# Patient Record
Sex: MALE | Race: WHITE | RURAL
[De-identification: names, ages, dates, MRNs, and addresses within clinical notes are randomized per-mention and may not be internally consistent; named-entity substitution may affect disease eponyms.]

---

## 2017-02-21 ENCOUNTER — TELEPHONE (OUTPATIENT)
Dept: INTERNAL MEDICINE CLINIC | Age: 67
End: 2017-02-21

## 2017-02-21 DIAGNOSIS — E78.2 MIXED HYPERLIPIDEMIA: Primary | ICD-10-CM

## 2017-02-21 NOTE — TELEPHONE ENCOUNTER
Patient wants labs drawn prior to his appt - please order in system  Zach Sow LPN  3/31/1903  30:02 AM

## 2017-02-21 NOTE — TELEPHONE ENCOUNTER
Patient would like to receive labs before his appt on February 28, 2017. Please call him at [phone].

## 2017-02-22 ENCOUNTER — TELEPHONE (OUTPATIENT)
Dept: INTERNAL MEDICINE CLINIC | Age: 67
End: 2017-02-22

## 2017-03-03 LAB
CHOLEST SERPL-MCNC: 257 MG/DL (ref 100–199)
HDLC SERPL-MCNC: 55 MG/DL
INTERPRETATION, 910389: NORMAL
LDLC SERPL CALC-MCNC: 173 MG/DL (ref 0–99)
TRIGL SERPL-MCNC: 144 MG/DL (ref 0–149)
VLDLC SERPL CALC-MCNC: 29 MG/DL (ref 5–40)

## 2017-03-09 ENCOUNTER — OFFICE VISIT (OUTPATIENT)
Dept: INTERNAL MEDICINE CLINIC | Age: 67
End: 2017-03-09

## 2017-03-09 VITALS
DIASTOLIC BLOOD PRESSURE: 62 MMHG | HEIGHT: 69 IN | BODY MASS INDEX: 35.4 KG/M2 | SYSTOLIC BLOOD PRESSURE: 125 MMHG | HEART RATE: 76 BPM | WEIGHT: 239 LBS | OXYGEN SATURATION: 97 % | RESPIRATION RATE: 20 BRPM | TEMPERATURE: 96 F

## 2017-03-09 DIAGNOSIS — E78.5 HYPERLIPIDEMIA, UNSPECIFIED HYPERLIPIDEMIA TYPE: ICD-10-CM

## 2017-03-09 DIAGNOSIS — R06.02 SOB (SHORTNESS OF BREATH) ON EXERTION: ICD-10-CM

## 2017-03-09 DIAGNOSIS — R07.9 CHEST PAIN OF UNCERTAIN ETIOLOGY: Primary | ICD-10-CM

## 2017-03-09 RX ORDER — GUAIFENESIN 100 MG/5ML
81 LIQUID (ML) ORAL DAILY
Qty: 90 TAB | Refills: 3 | Status: SHIPPED | OUTPATIENT
Start: 2017-03-09 | End: 2018-02-01 | Stop reason: ALTCHOICE

## 2017-03-09 NOTE — PATIENT INSTRUCTIONS
Learning About High Cholesterol  What is high cholesterol? Cholesterol is a type of fat in your blood. It is needed for many body functions, such as making new cells. Cholesterol is made by your body. It also comes from food you eat. If you have too much cholesterol, it starts to build up in your arteries. This is called hardening of the arteries, or atherosclerosis. High cholesterol raises your risk of a heart attack and stroke. There are different types of cholesterol. LDL is the \"bad\" cholesterol. High LDL can raise your risk for heart disease, heart attack, and stroke. HDL is the \"good\" cholesterol. High HDL is linked with a lower risk for heart disease, heart attack, and stroke. Your cholesterol levels help your doctor find out your risk for having a heart attack or stroke. How can you prevent high cholesterol? A heart-healthy lifestyle can help you prevent high cholesterol. This lifestyle helps lower your risk for a heart attack and stroke. · Eat heart-healthy foods. ¨ Eat fruits, vegetables, whole grains (like oatmeal), dried beans and peas, nuts and seeds, soy products (like tofu), and fat-free or low-fat dairy products. ¨ Replace butter, margarine, and hydrogenated or partially hydrogenated oils with olive and canola oils. (Canola oil margarine without trans fat is fine.)  ¨ Replace red meat with fish, poultry, and soy protein (like tofu). ¨ Limit processed and packaged foods like chips, crackers, and cookies. · Be active. Exercise can improve your cholesterol level. Get at least 30 minutes of exercise on most days of the week. Walking is a good choice. You also may want to do other activities, such as running, swimming, cycling, or playing tennis or team sports. · Stay at a healthy weight. Lose weight if you need to. · Don't smoke. If you need help quitting, talk to your doctor about stop-smoking programs and medicines. These can increase your chances of quitting for good.   How is high cholesterol treated? The goal of treatment is to reduce your chances of having a heart attack or stroke. The goal is not to lower your cholesterol numbers only. · You may make lifestyle changes, such as eating healthy foods, not smoking, losing weight, and being more active. · You may have to take medicine. Follow-up care is a key part of your treatment and safety. Be sure to make and go to all appointments, and call your doctor if you are having problems. It's also a good idea to know your test results and keep a list of the medicines you take. Where can you learn more? Go to http://yadyavolutionjoo.info/. Enter W377 in the search box to learn more about \"Learning About High Cholesterol. \"  Current as of: January 27, 2016  Content Version: 11.1  © 3046-5136 [employer]. Care instructions adapted under license by [employer] (which disclaims liability or warranty for this information). If you have questions about a medical condition or this instruction, always ask your healthcare professional. [place] [address] any warranty or liability for your use of this information. Stress Echocardiogram: About This Test  What is it? An echocardiogram (also called an echo) uses sound waves to make an image of your heart. A device called a transducer is moved across your chest. It looks like a microphone. The transducer sends sound waves that echo off your heart and back to the transducer. These echoes are turned into moving pictures of your heart that can be seen on a video screen. In a stress echocardiogram, an echo is done while your heart is at rest and after your heart is made to work hard (stressed). You exercise to make your heart work hard. Sometimes, instead of exercise, a medicine is used that makes your heart respond like you have been exercising. Why is this test done?   A stress echocardiogram is usually done to find out if you might have reduced blood flow to your heart. This is known as coronary artery disease. Reduced blood flow is easier to see when your heart is put under some form of stress. How can you prepare for the test?  · Do not smoke or eat a heavy meal before this test.  · Wear flat, comfortable shoes (no bedroom slippers) and loose, lightweight shorts or sweatpants. Walking or running shoes are best.  · Tell your doctor if:  Lisha Esteban You are taking any medicines. ¨ You are taking medicine for an erection problem, such as sildenafil (Viagra), tadalafil (Cialis), and vardenafil (Levitra). You may need to take nitroglycerin during this test, which can cause a serious reaction if you have taken a medicine for an erection problem within the past 48 hours. ¨ You have had bleeding problems, or if you take aspirin or some other blood thinner. ¨ You have joint problems in your hips or legs that may make it hard for you to exercise. ¨ You have a heart valve problem. What happens during the test?  You will first have an echocardiogram before exercising. This is called the baseline. You then exercise for a specific amount of time and then have another echocardiogram.  · You will take off all or most of your clothes and change into a gown. · You will lie on your back or on your left side on a bed or table. · You may receive medicine through a vein (intravenously, or IV). The IV can be used to give you a contrast material, which helps your doctor get good views of your heart. · Small pads or patches (electrodes) will be taped to your arms and legs to record your heart rate during the test.  · A small amount of gel will be rubbed on the left side of your chest to help  the sound waves. · The transducer will be pressed firmly against your chest and moved slowly back and forth. It is usually moved to different areas on your chest to get specific views of your heart.   · You will be asked to do several things, such as hold very still, breathe in and out very slowly, hold your breath, or lie on your left side. This test usually takes 30 to 60 minutes. When the echocardiogram is finished, you will exercise and then have another echocardiogram. If you are not able to exercise, you may be given medicine that stimulates your heart to beat harder and faster, as if you were exercising. You most likely will either walk on a treadmill or pedal a stationary bicycle. While you exercise:  · Your heart rate and blood pressure are recorded. · You might be asked to use numbers to say how hard you are exercising. The higher the number, the harder you think you are exercising. · You will continue to exercise until you or your doctor feels you need to stop. · You will then lie on a bed or table, and another echocardiogram will be done. An exercise stress echocardiogram takes about 30 to 60 minutes. What else should you know about the test?  · No electricity passes through your body during the test. There is no danger of getting an electrical shock. · During the tests, tell your doctor if:  ¨ You have chest pain. ¨ You are very short of breath. ¨ You are lightheaded. ¨ You have other symptoms. What happens after the test?  · You will be able to sit or lie down and rest.  · Your heart rate and blood pressure will be checked for about 5 to 10 minutes. · You can go back to your usual activities right away. When should you call for help? Call 911 anytime you think you may need emergency care. For example, call if:  · You passed out (lost consciousness). · You have been diagnosed with angina, and you have angina symptoms that do not go away with rest or are not getting better within 5 minutes after you take a dose of nitroglycerin. · You have symptoms of a heart attack, such as:  ¨ Chest pain or pressure. ¨ Sweating. ¨ Shortness of breath. ¨ Nausea or vomiting.   ¨ Pain that spreads from the chest to the neck, jaw, or one or both shoulders or arms.  ¨ Dizziness or lightheadedness. ¨ A fast or uneven pulse. After calling 911, chew 1 adult-strength aspirin. Wait for an ambulance. Do not try to drive yourself. Call your doctor now or seek immediate medical care if:  · You are having angina symptoms more often than usual, even if they go away when you rest or take nitroglycerin. · You feel dizzy or lightheaded, or you feel like you may faint. Watch closely for changes in your health, and be sure to contact your doctor if you have any problems. Follow-up care is a key part of your treatment and safety. Be sure to make and go to all appointments, and call your doctor if you are having problems. It's also a good idea to keep a list of the medicines you take. Ask your doctor when you can expect to have your test results. Where can you learn more? Go to http://yady-joo.info/. Enter W274 in the search box to learn more about \"Stress Echocardiogram: About This Test.\"  Current as of: May 2, 2016  Content Version: 11.1  © 1533-1844 [employer]. Care instructions adapted under license by [employer] (which disclaims liability or warranty for this information). If you have questions about a medical condition or this instruction, always ask your healthcare professional. [address] any warranty or liability for your use of this information.

## 2017-03-09 NOTE — MR AVS SNAPSHOT
Visit Information Date & Time Provider Department Dept. Phone Encounter #  
 3/9/2017  7:45 AM Nena Bradford MD [address] [facility] [phone] [id number] Follow-up Instructions Return in about 6 weeks (around 4/20/2017) for routine follow up. Upcoming Health Maintenance Date Due COLONOSCOPY 3/19/1970 ZOSTER VACCINE AGE 60> 3/19/2012 DTaP/Tdap/Td series (2 - Td) 1/21/2026 Allergies as of 3/9/2017  Review Complete On: 3/9/2017 By: Farideh Tobias LPN No Known Allergies Current Immunizations  Never Reviewed Name Date Tdap 1/21/2016 Not reviewed this visit You Were Diagnosed With   
  
 Codes Comments Chest pain of uncertain etiology    -  Primary ICD-10-CM: R07.89 ICD-9-CM: 786.59   
 SOB (shortness of breath) on exertion     ICD-10-CM: R06.02 
ICD-9-CM: 786.05 Hyperlipidemia, unspecified hyperlipidemia type     ICD-10-CM: E78.5 ICD-9-CM: 272.4 Vitals BP Pulse Temp Resp Height(growth percentile) Weight(growth percentile) 125/62 (BP 1 Location: Left arm, BP Patient Position: Sitting) 76 96 °F (35.6 °C) (Oral) 20 5' 9\" (1.753 m) 239 lb (108.4 kg) SpO2 BMI Smoking Status 97% 35.29 kg/m2 Former Smoker Vitals History BMI and BSA Data Body Mass Index Body Surface Area  
 35.29 kg/m 2 2.3 m 2 Preferred Pharmacy Pharmacy Name Phone [address] [facility], [address] [phone] Your Updated Medication List  
  
   
This list is accurate as of: 3/9/17  8:34 AM.  Always use your most recent med list.  
  
  
  
  
 aspirin 81 mg chewable tablet Take 1 Tab by mouth daily. sildenafil citrate 100 mg tablet Commonly known as:  VIAGRA Take 1 Tab by mouth as needed. Prescriptions Sent to Pharmacy Refills  
 aspirin 81 mg chewable tablet 3 Sig: Take 1 Tab by mouth daily.   
 Class: Normal  
 Pharmacy: [facility], [address] [place] [address] #: [phone] Route: Oral  
  
Follow-up Instructions Return in about 6 weeks (around 4/20/2017) for routine follow up. To-Do List   
 03/13/2017 ECHO:  ECHO EXERCISE STRESS Patient Instructions Learning About High Cholesterol What is high cholesterol? Cholesterol is a type of fat in your blood. It is needed for many body functions, such as making new cells. Cholesterol is made by your body. It also comes from food you eat. If you have too much cholesterol, it starts to build up in your arteries. This is called hardening of the arteries, or atherosclerosis. High cholesterol raises your risk of a heart attack and stroke. There are different types of cholesterol. LDL is the \"bad\" cholesterol. High LDL can raise your risk for heart disease, heart attack, and stroke. HDL is the \"good\" cholesterol. High HDL is linked with a lower risk for heart disease, heart attack, and stroke. Your cholesterol levels help your doctor find out your risk for having a heart attack or stroke. How can you prevent high cholesterol? A heart-healthy lifestyle can help you prevent high cholesterol. This lifestyle helps lower your risk for a heart attack and stroke. · Eat heart-healthy foods. ¨ Eat fruits, vegetables, whole grains (like oatmeal), dried beans and peas, nuts and seeds, soy products (like tofu), and fat-free or low-fat dairy products. ¨ Replace butter, margarine, and hydrogenated or partially hydrogenated oils with olive and canola oils. (Canola oil margarine without trans fat is fine.) ¨ Replace red meat with fish, poultry, and soy protein (like tofu). ¨ Limit processed and packaged foods like chips, crackers, and cookies. · Be active. Exercise can improve your cholesterol level. Get at least 30 minutes of exercise on most days of the week. Walking is a good choice. You also may want to do other activities, such as running, swimming, cycling, or playing tennis or team sports. · Stay at a healthy weight. Lose weight if you need to. · Don't smoke. If you need help quitting, talk to your doctor about stop-smoking programs and medicines. These can increase your chances of quitting for good. How is high cholesterol treated? The goal of treatment is to reduce your chances of having a heart attack or stroke. The goal is not to lower your cholesterol numbers only. · You may make lifestyle changes, such as eating healthy foods, not smoking, losing weight, and being more active. · You may have to take medicine. Follow-up care is a key part of your treatment and safety. Be sure to make and go to all appointments, and call your doctor if you are having problems. It's also a good idea to know your test results and keep a list of the medicines you take. Where can you learn more? Go to http://yady-joo.info/. Enter T648 in the search box to learn more about \"Learning About High Cholesterol. \" Current as of: January 27, 2016 Content Version: 11.1 © 4909-0160 [employer]. Care instructions adapted under license by [employer] (which disclaims liability or warranty for this information). If you have questions about a medical condition or this instruction, always ask your healthcare professional. [address] any warranty or liability for your use of this information. Stress Echocardiogram: About This Test 
What is it? An echocardiogram (also called an echo) uses sound waves to make an image of your heart. A device called a transducer is moved across your chest. It looks like a microphone. The transducer sends sound waves that echo off your heart and back to the transducer. These echoes are turned into moving pictures of your heart that can be seen on a video screen. In a stress echocardiogram, an echo is done while your heart is at rest and after your heart is made to work hard (stressed). You exercise to make your heart work hard. Sometimes, instead of exercise, a medicine is used that makes your heart respond like you have been exercising. Why is this test done? A stress echocardiogram is usually done to find out if you might have reduced blood flow to your heart. This is known as coronary artery disease. Reduced blood flow is easier to see when your heart is put under some form of stress. How can you prepare for the test? 
· Do not smoke or eat a heavy meal before this test. 
· Wear flat, comfortable shoes (no bedroom slippers) and loose, lightweight shorts or sweatpants. Walking or running shoes are best. 
· Tell your doctor if: 
Oval Osler You are taking any medicines. ¨ You are taking medicine for an erection problem, such as sildenafil (Viagra), tadalafil (Cialis), and vardenafil (Levitra). You may need to take nitroglycerin during this test, which can cause a serious reaction if you have taken a medicine for an erection problem within the past 48 hours. ¨ You have had bleeding problems, or if you take aspirin or some other blood thinner. ¨ You have joint problems in your hips or legs that may make it hard for you to exercise. ¨ You have a heart valve problem. What happens during the test? 
You will first have an echocardiogram before exercising. This is called the baseline. You then exercise for a specific amount of time and then have another echocardiogram. 
· You will take off all or most of your clothes and change into a gown. · You will lie on your back or on your left side on a bed or table. · You may receive medicine through a vein (intravenously, or IV). The IV can be used to give you a contrast material, which helps your doctor get good views of your heart.  
· Small pads or patches (electrodes) will be taped to your arms and legs to record your heart rate during the test. 
· A small amount of gel will be rubbed on the left side of your chest to help  the sound waves. · The transducer will be pressed firmly against your chest and moved slowly back and forth. It is usually moved to different areas on your chest to get specific views of your heart. · You will be asked to do several things, such as hold very still, breathe in and out very slowly, hold your breath, or lie on your left side. This test usually takes 30 to 60 minutes. When the echocardiogram is finished, you will exercise and then have another echocardiogram. If you are not able to exercise, you may be given medicine that stimulates your heart to beat harder and faster, as if you were exercising. You most likely will either walk on a treadmill or pedal a stationary bicycle. While you exercise: 
· Your heart rate and blood pressure are recorded. · You might be asked to use numbers to say how hard you are exercising. The higher the number, the harder you think you are exercising. · You will continue to exercise until you or your doctor feels you need to stop. · You will then lie on a bed or table, and another echocardiogram will be done. An exercise stress echocardiogram takes about 30 to 60 minutes. What else should you know about the test? 
· No electricity passes through your body during the test. There is no danger of getting an electrical shock. · During the tests, tell your doctor if: 
¨ You have chest pain. ¨ You are very short of breath. ¨ You are lightheaded. ¨ You have other symptoms. What happens after the test? 
· You will be able to sit or lie down and rest. 
· Your heart rate and blood pressure will be checked for about 5 to 10 minutes. · You can go back to your usual activities right away. When should you call for help? Call 911 anytime you think you may need emergency care. For example, call if: 
· You passed out (lost consciousness). · You have been diagnosed with angina, and you have angina symptoms that do not go away with rest or are not getting better within 5 minutes after you take a dose of nitroglycerin. · You have symptoms of a heart attack, such as: ¨ Chest pain or pressure. ¨ Sweating. ¨ Shortness of breath. ¨ Nausea or vomiting. ¨ Pain that spreads from the chest to the neck, jaw, or one or both shoulders or arms. ¨ Dizziness or lightheadedness. ¨ A fast or uneven pulse. After calling 911, chew 1 adult-strength aspirin. Wait for an ambulance. Do not try to drive yourself. Call your doctor now or seek immediate medical care if: 
· You are having angina symptoms more often than usual, even if they go away when you rest or take nitroglycerin. · You feel dizzy or lightheaded, or you feel like you may faint. Watch closely for changes in your health, and be sure to contact your doctor if you have any problems. Follow-up care is a key part of your treatment and safety. Be sure to make and go to all appointments, and call your doctor if you are having problems. It's also a good idea to keep a list of the medicines you take. Ask your doctor when you can expect to have your test results. Where can you learn more? Go to http://yady-joo.info/. Enter E946 in the search box to learn more about \"Stress Echocardiogram: About This Test.\" Current as of: May 2, 2016 Content Version: 11.1 © 1978-9193 [email or web address]. Care instructions adapted under license by [employer] (which disclaims liability or warranty for this information). If you have questions about a medical condition or this instruction, always ask your healthcare professional. [place] [address] any warranty or liability for your use of this information. Introducing \[facility]\"" & HEALTH SERVICES!    
 [employer] introduces [employer] patient portal. Now you can access parts of your medical record, email your doctor's office, and request medication refills online. 1. In your internet browser, go to [email or web address] 2. Click on the First Time User? Click Here link in the Sign In box. You will see the New Member Sign Up page. 3. Enter your Krauttools Access Code exactly as it appears below. You will not need to use this code after youve completed the sign-up process. If you do not sign up before the expiration date, you must request a new code. · Krauttools Access Code: [email or web address] Expires: 3/30/2017  2:25 PM 
 
4. Enter the last four digits of your Social Security Number (xxxx) and Date of Birth (mm/dd/yyyy) as indicated and click Submit. You will be taken to the next sign-up page. 5. Create a Krauttools ID. This will be your Krauttools login ID and cannot be changed, so think of one that is secure and easy to remember. 6. Create a Krauttools password. You can change your password at any time. 7. Enter your Password Reset Question and Answer. This can be used at a later time if you forget your password. 8. Enter your e-mail address. You will receive e-mail notification when new information is available in [address] 9. Click Sign Up. You can now view and download portions of your medical record. 10. Click the Download Summary menu link to download a portable copy of your medical information. If you have questions, please visit the Frequently Asked Questions section of the Krauttools website. Remember, Krauttools is NOT to be used for urgent needs. For medical emergencies, dial 911. Now available from your iPhone and Android! Please provide this summary of care documentation to your next provider. Your primary care clinician is listed as Willie Hurt. If you have any questions after today's visit, please call [phone].

## 2017-03-09 NOTE — PROGRESS NOTES
Subjective:     Parviz Lamb is a 61 y.o. male who presents for follow up of hyperlipidemia. New concerns: statins caused muscle aches. Ate lasagna and woke up with CP not comfortable. Occurred in Feb.  Called his daughter and she insisted he go to [facility], did studies inc an EKG. Neg w/u. No CP currently, No Hx of heart Dx. Notes inc SOB if exertional. Not new. Asks about testosterone. Not on replacement. Current Outpatient Prescriptions   Medication Sig Dispense Refill    sildenafil citrate (VIAGRA) 100 mg tablet Take 1 Tab by mouth as needed. 5 Tab 5     No Known Allergies    Diet and Lifestyle: not attempting to follow a low fat, low cholesterol diet    Cardiovascular ROS: no TIA's, no chest pain on exertion, notes new dyspnea on exertion more SOB, when exertional, not sure how long, was hauling . Review of Systems, additional:  Pertinent items are noted in HPI. Patient Active Problem List    Diagnosis Date Noted    Hyperlipidemia 04/07/2011    Hypogonadism male 04/07/2011     Current Outpatient Prescriptions   Medication Sig Dispense Refill    aspirin 81 mg chewable tablet Take 1 Tab by mouth daily. 90 Tab 3    sildenafil citrate (VIAGRA) 100 mg tablet Take 1 Tab by mouth as needed. 5 Tab 5     No Known Allergies  Social History   Substance Use Topics    Smoking status: Former Smoker     Quit date: 6/16/1979    Smokeless tobacco: Not on file    Alcohol use 8.4 oz/week     14 Standard drinks or equivalent per week        Will get [place] labs done in ER         Objective:     Physical exam significant for the following: WNL  Visit Vitals    /62 (BP 1 Location: Left arm, BP Patient Position: Sitting)    Pulse 76    Temp 96 °F (35.6 °C) (Oral)    Resp 20    Ht 5' 9\" (1.753 m)    Wt 239 lb (108.4 kg)    SpO2 97%    BMI 35.29 kg/m2     Appearance: alert, well appearing, and in no distress.   General exam: CVS exam BP noted to be well controlled today in office, S1, S2 normal, no gallop, no murmur, chest clear, no JVD, no HSM, no edema. .   Assessment/Plan:     hyperlipidemia control uncertain. Keaton Fiore Has not changed diet, will re-check. epsiode of CP get stress test  Discussed possible side affects, precautions, and drug interactions and possible benefits of the medication(s). Of testosterone replacement. ICD-10-CM ICD-9-CM    1. Chest pain of uncertain etiology L89.17 786.59 ECHO EXERCISE STRESS      CANCELED: STRESS CARDIAC WALL MOTION   2. SOB (shortness of breath) on exertion R06.02 786.05 ECHO EXERCISE STRESS   3. Hyperlipidemia, unspecified hyperlipidemia type E78.5 272.4      Orders Placed This Encounter    ECHO EXERCISE STRESS     Standing Status:   Future     Standing Expiration Date:   9/9/2017     Scheduling Instructions:      [facility] Doctors     Order Specific Question:   Reason for Exam:     Answer:   episode of CP and Increased SOB    aspirin 81 mg chewable tablet     Sig: Take 1 Tab by mouth daily. Dispense:  90 Tab     Refill:  3     Follow-up Disposition:  Return in about 6 weeks (around 4/20/2017) for routine follow up.

## 2017-03-09 NOTE — LETTER
3/9/2017 8:11 AM 
 
Mr. Angel MILLER [address] [facility] [address] Dear Angel Wong: 
 
Please find your most recent results below. Resulted Orders LIPID PANEL Result Value Ref Range Cholesterol, total 257 (H) 100 - 199 mg/dL Triglyceride 144 0 - 149 mg/dL HDL Cholesterol 55 >39 mg/dL VLDL, calculated 29 5 - 40 mg/dL LDL, calculated 173 (H) 0 - 99 mg/dL Narrative Performed at:  [address]  [id number] : Carl Prado MD, Phone:  [phone] CVD REPORT Result Value Ref Range INTERPRETATION Note Comment:  
   Supplement report is available. Narrative Performed at:  [address] [place] [address]  [id number] : Fatuma Umana PhD, Phone:  [phone] RECOMMENDATIONS: 
Work on diet and exercise. Please call me if you have any questions: [phone] Sincerely, Chao Villaseñor MD

## 2017-03-13 ENCOUNTER — DOCUMENTATION ONLY (OUTPATIENT)
Dept: INTERNAL MEDICINE CLINIC | Age: 67
End: 2017-03-13

## 2017-03-13 NOTE — PROGRESS NOTES
Notified patient of appointment scheduled for stress ECHO on Thursday March 16, 2017 at [address] [place] [address] by 830 am to Pham Manriquez - instructed no food or drink after midnight, wear comfortable walking shoes - phone number [phone] and fax [phone] - order has been faxed  Kelvin Lara LPN  7/35/7569  5:14 PM

## 2017-03-16 ENCOUNTER — DOCUMENTATION ONLY (OUTPATIENT)
Dept: INTERNAL MEDICINE CLINIC | Age: 67
End: 2017-03-16

## 2017-03-16 NOTE — PROGRESS NOTES
Received call from [facility] - [place] needed for stress echo scheduled for today - called [address] [place] [address] at [phone] - Approval received after Dr review and additional clinical information - Approved #[id number] 3/16/17 - 4/14/17 - notified [facility] admitting office of this approval -  Vania Donnelly LPN  5/06/6637  8:46 AM

## 2017-03-24 ENCOUNTER — DOCUMENTATION ONLY (OUTPATIENT)
Dept: INTERNAL MEDICINE CLINIC | Age: 67
End: 2017-03-24

## 2017-03-24 NOTE — PROGRESS NOTES
Pt visited to see if we have gotten results for his stress test wanted to pu information regarding test adv that he may check back to see if results are in  Pt would like a call back with the stress test results pt appt was on 3/16 at [facility] would like nurse to please contact cardiology to check on the status of pt results

## 2017-03-27 NOTE — PROGRESS NOTES
Called [facility] EMERGENCY MEDICAL [place] at [phone] echo department to request copy of most recent stress echo report - no answer - left message on the answering machine to return my call or to fax the results to [address], LPN  6/94/0192  6:13 PM

## 2017-03-28 NOTE — PROGRESS NOTES
Called again to [facility] EMERGENCY [facility] - the ECHO department will send us a copy of this report MARIA E Sow LPN  3/73/9230  2:20 PM

## 2017-03-29 NOTE — PROGRESS NOTES
Notified patient that stress test results are normal per order of Tameka Silveira LPN  9/03/2326  7:43 AM

## 2017-04-26 ENCOUNTER — OFFICE VISIT (OUTPATIENT)
Dept: INTERNAL MEDICINE CLINIC | Age: 67
End: 2017-04-26

## 2017-04-26 VITALS
OXYGEN SATURATION: 97 % | RESPIRATION RATE: 16 BRPM | TEMPERATURE: 97.8 F | SYSTOLIC BLOOD PRESSURE: 137 MMHG | HEART RATE: 84 BPM | DIASTOLIC BLOOD PRESSURE: 76 MMHG | BODY MASS INDEX: 34.07 KG/M2 | WEIGHT: 230 LBS | HEIGHT: 69 IN

## 2017-04-26 DIAGNOSIS — L02.92 BOIL: ICD-10-CM

## 2017-04-26 DIAGNOSIS — D22.9 ATYPICAL MOLE: Primary | ICD-10-CM

## 2017-04-26 NOTE — PATIENT INSTRUCTIONS
Skin Lesion Removal: What to Expect at Home  Your Recovery  After your procedure, you should not have much pain. But some soreness, swelling, or bruising is normal. Your doctor may recommend over-the-counter medicines to help with any discomfort. Most people can return to their normal routine the same day of their procedure. How quickly your wound heals depends on the size of your wound and the type of procedure you had. Most wounds take 1 to 3 weeks to heal. If you had laser surgery, your skin may change color and then slowly return to its normal color. You may need only a bandage, or you may need stitches. If you had stitches, your doctor will probably remove them 5 to 14 days later. If you have the type of stitches that dissolve, they do not have to be removed. They will disappear on their own. This care sheet gives you a general idea about how long it will take for you to recover. But each person recovers at a different pace. Follow the steps below to get better as quickly as possible. How can you care for yourself at home? Activity  · For the first few days, try not to bump or knock your wound. · Depending on where your wound is, you may need to avoid strenuous exercise for 2 weeks after the procedure or until your doctor says it is okay. · If you have had a lesion removed from your face, do not use makeup near your wound until you have your stitches taken out. · Ask your doctor when it is okay to shower, bathe, or swim. Medicines  · Your doctor will tell you if and when you can restart your medicines. He or she will also give you instructions about taking any new medicines. · If you take blood thinners, such as warfarin (Coumadin), clopidogrel (Plavix), or aspirin, be sure to talk to your doctor. He or she will tell you if and when to start taking those medicines again. Make sure that you understand exactly what your doctor wants you to do. · Be safe with medicines.  Take pain medicines exactly as directed. ¨ If the doctor gave you a prescription medicine for pain, take it as prescribed. ¨ If you are not taking a prescription pain medicine, ask your doctor if you can take an over-the-counter medicine. Wound care  · If your doctor told you how to care for your incision, follow your doctor's instructions. If you did not get instructions, follow this general advice:  ¨ Keep the wound bandaged and dry for the first day. ¨ After the first 24 to 48 hours, wash around the wound with clean water 2 times a day. Don't use hydrogen peroxide or alcohol, which can slow healing. ¨ You may cover the wound with a thin layer of petroleum jelly, such as Vaseline, and a nonstick bandage. ¨ Apply more petroleum jelly and replace the bandage as needed. · If you have stitches, you may get other instructions. · If a scab forms, do not pull it off. Let it fall off on its own. Wounds heal faster if no scab forms. Washing the area every day and using petroleum jelly will help prevent a scab from forming. · If the wound bleeds, put direct pressure on it with a clean cloth until the bleeding stops. · If you had a growth \"frozen\" off, you may get a blister. Do not break it. Let it dry up on its own. It is common for the blister to fill with blood. You do not need to do anything about this, but if it becomes too painful, call your doctor. · Avoid the sun until your stitches are removed. Follow-up care is a key part of your treatment and safety. Be sure to make and go to all appointments, and call your doctor if you are having problems. It's also a good idea to know your test results and keep a list of the medicines you take. When should you call for help? Call 911 anytime you think you may need emergency care. For example, call if:  · You passed out (lost consciousness). · You have severe trouble breathing. · You have sudden chest pain and shortness of breath, or you cough up blood.   Call your doctor now or seek immediate medical care if:  · You have signs of infection, such as:  ¨ Increased pain, swelling, warmth, or redness. ¨ Red streaks leading from the wound. ¨ Pus draining from the wound. ¨ A fever. · You have pain that does not get better after you take pain medicine. · You have loose stitches. Watch closely for changes in your health, and be sure to contact your doctor if you have any problems. Where can you learn more? Go to http://yady-joo.info/. Enter Q228 in the search box to learn more about \"Skin Lesion Removal: What to Expect at Home. \"  Current as of: October 13, 2016  Content Version: 11.2  © 0422-3291 [employer]. Care instructions adapted under license by [employer] (which disclaims liability or warranty for this information). If you have questions about a medical condition or this instruction, always ask your healthcare professional. [address] any warranty or liability for your use of this information.

## 2017-04-26 NOTE — MR AVS SNAPSHOT
Visit Information Date & Time Provider Department Dept. Phone Encounter #  
 4/26/2017  1:45 PM Mica Scruggs  [facility] [id number] Follow-up Instructions Return if symptoms worsen or fail to improve. Upcoming Health Maintenance Date Due ZOSTER VACCINE AGE 60> 3/19/2012 GLAUCOMA SCREENING Q2Y 3/19/2017 Pneumococcal 65+ Low/Medium Risk (1 of 2 - PCV13) 3/19/2017 MEDICARE YEARLY EXAM 3/19/2017 COLONOSCOPY 5/18/2025 DTaP/Tdap/Td series (2 - Td) 1/21/2026 Allergies as of 4/26/2017  Review Complete On: 3/9/2017 By: Rudi Telles LPN No Known Allergies Current Immunizations  Never Reviewed Name Date Tdap 1/21/2016 Not reviewed this visit You Were Diagnosed With   
  
 Codes Comments Atypical mole    -  Primary ICD-10-CM: D22.9 ICD-9-CM: 216.9 Boil     ICD-10-CM: L02.92 
ICD-9-CM: 680.9 Vitals BP Pulse Temp Resp Height(growth percentile) Weight(growth percentile)  
 137/76 (BP 1 Location: Left arm, BP Patient Position: Sitting) 84 97.8 °F (36.6 °C) (Oral) 16 5' 9\" (1.753 m) 230 lb (104.3 kg) SpO2 BMI Smoking Status 97% 33.97 kg/m2 Former Smoker BMI and BSA Data Body Mass Index Body Surface Area  
 33.97 kg/m 2 2.25 m 2 Preferred Pharmacy Pharmacy Name Phone [address] [facility], [address] [facility] [address] [facility] [phone] Your Updated Medication List  
  
   
This list is accurate as of: 4/26/17  2:38 PM.  Always use your most recent med list.  
  
  
  
  
 aspirin 81 mg chewable tablet Take 1 Tab by mouth daily. sildenafil citrate 100 mg tablet Commonly known as:  VIAGRA Take 1 Tab by mouth as needed. We Performed the Following DRAIN SKIN ABSCESS SIMPLE [79943 CPT(R)] EXC SKIN BENIG 0.6-1CM REMAINDR BODY [73334 CPT(R)] Follow-up Instructions Return if symptoms worsen or fail to improve. Patient Instructions Skin Lesion Removal: What to Expect at Home Your Recovery After your procedure, you should not have much pain. But some soreness, swelling, or bruising is normal. Your doctor may recommend over-the-counter medicines to help with any discomfort. Most people can return to their normal routine the same day of their procedure. How quickly your wound heals depends on the size of your wound and the type of procedure you had. Most wounds take 1 to 3 weeks to heal. If you had laser surgery, your skin may change color and then slowly return to its normal color. You may need only a bandage, or you may need stitches. If you had stitches, your doctor will probably remove them 5 to 14 days later. If you have the type of stitches that dissolve, they do not have to be removed. They will disappear on their own. This care sheet gives you a general idea about how long it will take for you to recover. But each person recovers at a different pace. Follow the steps below to get better as quickly as possible. How can you care for yourself at home? Activity · For the first few days, try not to bump or knock your wound. · Depending on where your wound is, you may need to avoid strenuous exercise for 2 weeks after the procedure or until your doctor says it is okay. · If you have had a lesion removed from your face, do not use makeup near your wound until you have your stitches taken out. · Ask your doctor when it is okay to shower, bathe, or swim. Medicines · Your doctor will tell you if and when you can restart your medicines. He or she will also give you instructions about taking any new medicines. · If you take blood thinners, such as warfarin (Coumadin), clopidogrel (Plavix), or aspirin, be sure to talk to your doctor. He or she will tell you if and when to start taking those medicines again. Make sure that you understand exactly what your doctor wants you to do. · Be safe with medicines. Take pain medicines exactly as directed. ¨ If the doctor gave you a prescription medicine for pain, take it as prescribed. ¨ If you are not taking a prescription pain medicine, ask your doctor if you can take an over-the-counter medicine. Wound care · If your doctor told you how to care for your incision, follow your doctor's instructions. If you did not get instructions, follow this general advice: ¨ Keep the wound bandaged and dry for the first day. ¨ After the first 24 to 48 hours, wash around the wound with clean water 2 times a day. Don't use hydrogen peroxide or alcohol, which can slow healing. ¨ You may cover the wound with a thin layer of petroleum jelly, such as Vaseline, and a nonstick bandage. ¨ Apply more petroleum jelly and replace the bandage as needed. · If you have stitches, you may get other instructions. · If a scab forms, do not pull it off. Let it fall off on its own. Wounds heal faster if no scab forms. Washing the area every day and using petroleum jelly will help prevent a scab from forming. · If the wound bleeds, put direct pressure on it with a clean cloth until the bleeding stops. · If you had a growth \"frozen\" off, you may get a blister. Do not break it. Let it dry up on its own. It is common for the blister to fill with blood. You do not need to do anything about this, but if it becomes too painful, call your doctor. · Avoid the sun until your stitches are removed. Follow-up care is a key part of your treatment and safety. Be sure to make and go to all appointments, and call your doctor if you are having problems. It's also a good idea to know your test results and keep a list of the medicines you take. When should you call for help? Call 911 anytime you think you may need emergency care. For example, call if: 
· You passed out (lost consciousness). · You have severe trouble breathing. · You have sudden chest pain and shortness of breath, or you cough up blood. Call your doctor now or seek immediate medical care if: 
· You have signs of infection, such as: 
¨ Increased pain, swelling, warmth, or redness. ¨ Red streaks leading from the wound. ¨ Pus draining from the wound. ¨ A fever. · You have pain that does not get better after you take pain medicine. · You have loose stitches. Watch closely for changes in your health, and be sure to contact your doctor if you have any problems. Where can you learn more? Go to http://yady-joo.info/. Enter Q228 in the search box to learn more about \"Skin Lesion Removal: What to Expect at Home. \" Current as of: October 13, 2016 Content Version: 11.2 © 4655-8594 "[employer]. Care instructions adapted under license by [employer] (which disclaims liability or warranty for this information). If you have questions about a medical condition or this instruction, always ask your healthcare professional. [place] [address] any warranty or liability for your use of this information. Introducing [facility]! Kamilla Westbrook introduces "[employer]" patient portal. Now you can access parts of your medical record, email your doctor's office, and request medication refills online. 1. In your internet browser, go to [email or web address]. [place]/Probki Iz okna 2. Click on the First Time User? Click Here link in the Sign In box. You will see the New Member Sign Up page. 3. Enter your "[employer]" Access Code exactly as it appears below. You will not need to use this code after youve completed the sign-up process. If you do not sign up before the expiration date, you must request a new code. · "[employer]" Access Code: [email or web address] Expires: 7/25/2017  1:38 PM 
 
4. Enter the last four digits of your Social Security Number (xxxx) and Date of Birth (mm/dd/yyyy) as indicated and click Submit.  You will be taken to the next sign-up page. 5. Create a [employer] ID. This will be your [employer] login ID and cannot be changed, so think of one that is secure and easy to remember. 6. Create a [employer] password. You can change your password at any time. 7. Enter your Password Reset Question and Answer. This can be used at a later time if you forget your password. 8. Enter your e-mail address. You will receive e-mail notification when new information is available in [address] 9. Click Sign Up. You can now view and download portions of your medical record. 10. Click the Download Summary menu link to download a portable copy of your medical information. If you have questions, please visit the Frequently Asked Questions section of the [employer] website. Remember, [employer] is NOT to be used for urgent needs. For medical emergencies, dial 911. Now available from your iPhone and Android! Please provide this summary of care documentation to your next provider. Your primary care clinician is listed as Omar Mcginnis. If you have any questions after today's visit, please call [phone].

## 2017-04-28 ENCOUNTER — HOSPITAL ENCOUNTER (OUTPATIENT)
Dept: LAB | Age: 67
Discharge: HOME | End: 2017-04-28

## 2017-04-28 NOTE — LETTER
5/19/2017 4:19 PM 
 
Mr. Betty Cisse [address] [address] Dear Betty Anastasiaalcon: YOUR RECENT LABS ARE NORMAL Please find your most recent results below. Please call me if you have any questions: [phone] Sincerely, LAB OUTREACH CLIENT

## 2017-04-29 NOTE — PROGRESS NOTES
HISTORY OF PRESENT ILLNESS  Duane Collette Galli is a 74 y.o. male. Mole   The history is provided by the patient. This is a new problem. The current episode started more than 1 week ago. The problem occurs daily. The problem has been gradually worsening. Pertinent negatives include no chest pain and no abdominal pain. Associated symptoms comments: Mole occa bleeds when it gets caught. He has tried nothing for the symptoms. Also has an adjacent boil    Review of Systems   Cardiovascular: Negative for chest pain. Gastrointestinal: Negative for abdominal pain. Skin: Positive for rash. Negative for itching. Physical Exam  Visit Vitals    /76 (BP 1 Location: Left arm, BP Patient Position: Sitting)    Pulse 84    Temp 97.8 °F (36.6 °C) (Oral)    Resp 16    Ht 5' 9\" (1.753 m)    Wt 230 lb (104.3 kg)    SpO2 97%    BMI 33.97 kg/m2     WD WN male NAD  Heart RRR without murmers clicks or rubs  Lungs CTA  Abdo soft nontender  Ext no edema  Right chest 1 cm raised nodule. 2 cm right boil  ASSESSMENT and PLAN  Encounter Diagnoses   Name Primary?  Atypical mole Yes    Boil      Orders Placed This Encounter    ID BIOPSY OF SKIN LESION    PATHOLOGY-SURGICAL     Scheduling Instructions:      Removal of mole upper R/chest     The area was cleaned with alcohol. It was numbed with  Ethyl Chl. A incision was made over the area. It drained pus  no, serosanguinous fluid , yes . A pressure dressing was applied. The boil was packed  no. A wound culture was done  no. Anibiotics were given  no. The procedure was well tolerated. Area to be excised was cleaned with EtOH. The area was numbed with lidocaine/epi. Using a 8 mm hole biopsy device the are of skin was excised and removed. The procedure was well tolerated. There was some bleeding but it was easily stopped with pressure. Sample for path. Follow-up Disposition:  Return if symptoms worsen or fail to improve.

## 2018-01-11 ENCOUNTER — TELEPHONE (OUTPATIENT)
Dept: INTERNAL MEDICINE CLINIC | Age: 68
End: 2018-01-11

## 2018-01-11 DIAGNOSIS — E29.1 HYPOGONADISM MALE: ICD-10-CM

## 2018-01-11 DIAGNOSIS — E78.5 HYPERLIPIDEMIA, UNSPECIFIED HYPERLIPIDEMIA TYPE: Primary | ICD-10-CM

## 2018-01-11 NOTE — TELEPHONE ENCOUNTER
Per order of Dr Blanca Anderson ordered labs for [facility] and Lipid panel and given order to patient - he will take it to [facility] today to have these drawn - he is fasting  Martin Reyes LPN  5/76/2409  46:58 AM

## 2018-01-11 NOTE — TELEPHONE ENCOUNTER
Pt visited practice wanted an lab order for his labs so that he can take it over to [facility]

## 2018-01-12 LAB
ALBUMIN SERPL-MCNC: 4.4 G/DL (ref 3.6–4.8)
ALBUMIN/GLOB SERPL: 1.7 {RATIO} (ref 1.2–2.2)
ALP SERPL-CCNC: 82 IU/L (ref 39–117)
ALT SERPL-CCNC: 45 IU/L (ref 0–44)
AST SERPL-CCNC: 24 IU/L (ref 0–40)
BILIRUB SERPL-MCNC: 0.5 MG/DL (ref 0–1.2)
BUN SERPL-MCNC: 11 MG/DL (ref 8–27)
BUN/CREAT SERPL: 12 (ref 10–24)
CALCIUM SERPL-MCNC: 9.3 MG/DL (ref 8.6–10.2)
CHLORIDE SERPL-SCNC: 101 MMOL/L (ref 96–106)
CHOLEST SERPL-MCNC: 263 MG/DL (ref 100–199)
CO2 SERPL-SCNC: 25 MMOL/L (ref 18–29)
CREAT SERPL-MCNC: 0.93 MG/DL (ref 0.76–1.27)
GLOBULIN SER CALC-MCNC: 2.6 G/DL (ref 1.5–4.5)
GLUCOSE SERPL-MCNC: 91 MG/DL (ref 65–99)
HDLC SERPL-MCNC: 63 MG/DL
INTERPRETATION, 910389: NORMAL
LDLC SERPL CALC-MCNC: 172 MG/DL (ref 0–99)
POTASSIUM SERPL-SCNC: 4.6 MMOL/L (ref 3.5–5.2)
PROT SERPL-MCNC: 7 G/DL (ref 6–8.5)
SODIUM SERPL-SCNC: 141 MMOL/L (ref 134–144)
TRIGL SERPL-MCNC: 140 MG/DL (ref 0–149)
VLDLC SERPL CALC-MCNC: 28 MG/DL (ref 5–40)

## 2018-01-15 ENCOUNTER — TELEPHONE (OUTPATIENT)
Dept: INTERNAL MEDICINE CLINIC | Age: 68
End: 2018-01-15

## 2018-01-29 ENCOUNTER — TELEPHONE (OUTPATIENT)
Dept: INTERNAL MEDICINE CLINIC | Age: 68
End: 2018-01-29

## 2018-01-29 NOTE — TELEPHONE ENCOUNTER
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

## 2018-02-01 ENCOUNTER — OFFICE VISIT (OUTPATIENT)
Dept: INTERNAL MEDICINE CLINIC | Age: 68
End: 2018-02-01

## 2018-02-01 VITALS
SYSTOLIC BLOOD PRESSURE: 125 MMHG | TEMPERATURE: 97.3 F | WEIGHT: 231 LBS | RESPIRATION RATE: 20 BRPM | HEART RATE: 70 BPM | OXYGEN SATURATION: 99 % | DIASTOLIC BLOOD PRESSURE: 68 MMHG | HEIGHT: 69 IN | BODY MASS INDEX: 34.21 KG/M2

## 2018-02-01 DIAGNOSIS — Z02.4 ENCOUNTER FOR CDL (COMMERCIAL DRIVING LICENSE) EXAM: ICD-10-CM

## 2018-02-01 DIAGNOSIS — Z13.1 SCREENING FOR DIABETES MELLITUS: ICD-10-CM

## 2018-02-01 DIAGNOSIS — Z23 ENCOUNTER FOR IMMUNIZATION: ICD-10-CM

## 2018-02-01 DIAGNOSIS — Z13.39 SCREENING FOR ALCOHOLISM: ICD-10-CM

## 2018-02-01 DIAGNOSIS — R74.8 ELEVATED LIVER ENZYMES: ICD-10-CM

## 2018-02-01 DIAGNOSIS — E78.00 ELEVATED CHOLESTEROL: ICD-10-CM

## 2018-02-01 DIAGNOSIS — Z13.6 SCREENING FOR ISCHEMIC HEART DISEASE: ICD-10-CM

## 2018-02-01 DIAGNOSIS — Z13.31 SCREENING FOR DEPRESSION: ICD-10-CM

## 2018-02-01 DIAGNOSIS — Z00.00 WELCOME TO MEDICARE PREVENTIVE VISIT: Primary | ICD-10-CM

## 2018-02-01 LAB
BILIRUB UR QL STRIP: NEGATIVE
GLUCOSE UR-MCNC: NEGATIVE MG/DL
KETONES P FAST UR STRIP-MCNC: NEGATIVE MG/DL
PH UR STRIP: 7 [PH] (ref 4.6–8)
PROT UR QL STRIP: NEGATIVE
SP GR UR STRIP: 1.02 (ref 1–1.03)
UA UROBILINOGEN AMB POC: NORMAL (ref 0.2–1)
URINALYSIS CLARITY POC: CLEAR
URINALYSIS COLOR POC: YELLOW
URINE BLOOD POC: NEGATIVE
URINE LEUKOCYTES POC: NEGATIVE
URINE NITRITES POC: NEGATIVE

## 2018-02-01 NOTE — PATIENT INSTRUCTIONS
Medicare Wellness Visit, Male    The best way to live healthy is to have a healthy lifestyle by eating a well-balanced diet, exercising regularly, limiting alcohol and stopping smoking. Regular physical exams and screening tests are another way to keep healthy. Preventive exams provided by your health care provider can find health problems before they become diseases or illnesses. Preventive services including immunizations, screening tests, monitoring and exams can help you take care of your own health. All people over age 72 should have a pneumovax  and and a prevnar shot to prevent pneumonia. These are once in a lifetime unless you and your provider decide differently. All people over 65 should have a yearly flu shot and a tetanus vaccine every 10 years. Screening for diabetes mellitus with a blood sugar test should be done every year. Glaucoma is a disease of the eye due to increased ocular pressure that can lead to blindness and it should be done every year by an eye professional.    Cardiovascular screening tests that check for elevated lipids (fatty part of blood) which can lead to heart disease and strokes should be done every 5 years. Colorectal screening that evaluates for blood or polyps in your colon should be done yearly as a stool test or every five years as a flexible sigmoidoscope or every 10 years as a colonoscopy up to age 76. Men up to age 76 may need a screening blood test for prostate cancer at certain intervals, depending on their personal and family history. This decision is between the patient and his provider. If you have been a smoker or had family history of abdominal aortic aneurysms, you and your provider may decide to schedule an ultrasound test of your aorta. Hepatitis C screening is also recommended for anyone born between [birth year] through [address]. A shingles vaccine is also recommended once in a lifetime after age 61.     Your Medicare Wellness Exam is recommended annually. Here is a list of your current Health Maintenance items with a due date:  Health Maintenance Due   Topic Date Due    Shingles Vaccine  01/19/2012    Glaucoma Screening   03/19/2017    Pneumococcal Vaccine (1 of 2 - PCV13) 03/19/2017    Annual Well Visit  03/19/2017          Learning About Low-Fat Eating  What is low-fat eating? Most food has some fat in it. Your body needs some fat to be healthy. But some kinds of fats are healthier than others. In a low-fat eating plan, you try to choose healthier fats and eat fewer unhealthy fats. Healthy fats include olive and canola oil. Try to avoid eating too much saturated fat (such as in cheese and meats) and trans fat (a type of fat found in many packaged snack foods and other baked goods). You do not need to cut all fat from your diet. But you can make healthier choices about the types and amount of fat you eat. Even though it is a good idea to choose healthier fats, it is still important to be careful of how much fat you eat, because all fats are high in calories. What are the different types of fats? Unhealthy fats  · Saturated fat. Saturated fats are mostly in animal foods, such as meat and dairy foods. Tropical oils, such as coconut oil, palm oil, and cocoa butter, are also saturated fats. · Trans fat. Trans fats include shortening, partially hydrogenated vegetable oils, and hydrogenated vegetable oils. Trans fats are made when a liquid fat is turned into a solid fat (for example, when corn oil is made into stick margarine). They are in many processed foods, such as cookies, crackers, and snack foods. Healthy fats  · Monounsaturated fat. Monounsaturated fats are liquid at room temperature but get solid when refrigerated. Eating foods that are high in this fat may help lower your \"bad\" (LDL) cholesterol, keep your \"good\" (HDL) cholesterol level up, and lower your chances of getting heart disease.  This fat is found in canola oil, olive oil, peanut oil, olives, avocados, nuts, and nut butters. · Polyunsaturated fat. Polyunsaturated fats are liquid at room temperature. They are in safflower, sunflower, and corn oils. They are also the main fat in seafood. Omega-3 fatty acids are types of polyunsaturated fat. Omega-3 fatty acids may lower your chances of getting heart disease. Fatty fish such as salmon and mackerel contain these healthy fatty acids. So do ground flaxseeds and flaxseed oil, soybeans, walnuts, and seeds. Why cut down on unhealthy fats? Eating foods that contain saturated fats can raise the LDL (\"bad\") cholesterol in your blood. Having a high level of LDL cholesterol increases your chance of hardening of the arteries (atherosclerosis), which can lead to heart disease, heart attack, and stroke. Trans fat raises the level of \"bad\" LDL cholesterol in your blood and may lower the \"good\" HDL cholesterol in your blood. Trans fat can raise your risk of heart disease, heart attack, and stroke. In general:  · No more than 10% of your daily calories should come from saturated fat. This is about 20 grams in a 2,000-calorie diet. · No more than 10% of your daily calories should come from polyunsaturated fat. This is about 20 grams in a 2,000-calorie diet. · Monounsaturated fats can be up to 15% of your daily calories. This is about 25 to 30 grams in a 2,000-calorie diet. If you're not sure how much fat you should be eating or how many calories you need each day to stay at a healthy weight, talk to a registered dietitian. He or she can help you create a plan that's right for you. What can you do to cut down on fat? Foods like cheese, butter, sausage, and desserts can have a lot of unhealthy fats. Try these tips for healthier meals at home and when you eat out. At home  · Fill up on fruits, vegetables, and whole grains.   · Think of meat as a side dish instead of as the main part of your meal.  · When you do eat meat, make it extra-lean ground beef (97% lean), ground turkey breast (without skin added), meats with fat trimmed off before cooking, or skinless chicken. · Try main dishes that use whole wheat pasta, brown rice, dried beans, or vegetables. · Use cooking methods that use little or no fat, such as broiling, steaming, or grilling. Use cooking spray instead of oil. If you use oil, use a monounsaturated oil, such as canola or olive oil. · Read food labels on canned, bottled, or packaged foods. Choose those with little saturated fat and no trans fat. When eating out at a restaurant  · Order foods that are broiled or poached instead of fried or breaded. · Cut back on the amount of butter or margarine that you use on bread. Use small amounts of olive oil instead. · Order sauces, gravies, and salad dressings on the side, and use only a little. · When you order pasta, choose tomato sauce instead of cream sauce. · Ask for salsa with your baked potato instead of sour cream, butter, cheese, or jorge. Where can you learn more? Go to http://yady-joo.info/. Enter W211 in the search box to learn more about \"Learning About Low-Fat Eating. \"  Current as of: May 12, 2017  Content Version: 11.4  © 3958-9155 [employer], [employer]. Care instructions adapted under license by [employer] (which disclaims liability or warranty for this information). If you have questions about a medical condition or this instruction, always ask your healthcare professional. [place] [address] any warranty or liability for your use of this information.

## 2018-02-01 NOTE — PROGRESS NOTES
Welcome to Medicare visit - also DOT physical (see form) - no other c/o  Carolyn North LPN  0/2/3591  40:34 AM

## 2018-02-01 NOTE — MR AVS SNAPSHOT
[address] [facility] 
[phone] Patient: Adam Aceves MRN: [id number] Visit Information Date & Time Provider Department Dept. Phone Encounter #  
 2/1/2018  9:45 AM Joaquin Victor MD [address] [facility] [phone] [id number] Follow-up Instructions Return in about 6 months (around 8/1/2018). Upcoming Health Maintenance Date Due ZOSTER VACCINE AGE 60> 1/19/2012 GLAUCOMA SCREENING Q2Y 3/19/2017 Pneumococcal 65+ Low/Medium Risk (1 of 2 - PCV13) 3/19/2017 MEDICARE YEARLY EXAM 3/19/2017 COLONOSCOPY 5/18/2025 DTaP/Tdap/Td series (2 - Td) 1/21/2026 Allergies as of 2/1/2018  Review Complete On: 2/1/2018 By: Joaquin Victor MD  
 No Known Allergies Current Immunizations  Reviewed on 2/1/2018 Name Date Influenza Vaccine 12/1/2017 Pneumococcal Conjugate (PCV-13)  Incomplete Tdap 1/21/2016 Reviewed by Joaquin Victor MD on 2/1/2018 at 10:24 AM  
You Were Diagnosed With   
  
 Codes Comments Welcome to Medicare preventive visit    -  Primary ICD-10-CM: Z00.00 ICD-9-CM: V70.0 Encounter for immunization     ICD-10-CM: D63 ICD-9-CM: V03.89 Screening for alcoholism     ICD-10-CM: Z13.89 ICD-9-CM: V79.1 Screening for depression     ICD-10-CM: Z13.89 ICD-9-CM: V79.0 Screening for diabetes mellitus     ICD-10-CM: Z13.1 ICD-9-CM: V77.1 Screening for ischemic heart disease     ICD-10-CM: Z13.6 ICD-9-CM: V81.0 Encounter for CDL (commercial driving license) exam     [id number]-[id number]: Z02.4 ICD-9-CM: V70.5 Elevated liver enzymes     ICD-10-CM: R74.8 ICD-9-CM: 790.5 Elevated cholesterol     ICD-10-CM: E78.00 ICD-9-CM: 272.0 Vitals BP Pulse Temp Resp Height(growth percentile) Weight(growth percentile)  125/68 (BP 1 Location: Left arm, BP Patient Position: At rest) 70 97.3 °F (36.3 °C) (Oral) 20 5' 9\" (1.753 m) 231 lb (104.8 kg) SpO2 BMI Smoking Status 99% 34.11 kg/m2 Former Smoker BMI and BSA Data Body Mass Index Body Surface Area  
 34.11 kg/m 2 2.26 m 2 Preferred Pharmacy Pharmacy Name Phone [address] [facility] [address], [address] [phone] Your Updated Medication List  
  
   
This list is accurate as of: 2/1/18 11:09 AM.  Always use your most recent med list.  
  
  
  
  
 AFLURIA QUAD 6458-2406 (PF) Syrg injection Generic drug:  influenza vaccine 2017-18 (18 yrs+)(PF)  
inject 0.5 milliliter intramuscularly We Performed the Following [address] [KEQY5205 HCP] LIPID PANEL [32420 CPT(R)] PNEUMOCOCCAL CONJ VACCINE 13 VALENT IM [id number] CPT(R)] PA ANNUAL ALCOHOL SCREEN 15 MIN [id number] [facility]] Follow-up Instructions Return in about 6 months (around 8/1/2018). To-Do List   
 02/01/2018 Lab:  LIPID PANEL   
  
 02/01/2018 Lab:  METABOLIC PANEL, COMPREHENSIVE Patient Instructions Medicare Wellness Visit, Male The best way to live healthy is to have a healthy lifestyle by eating a well-balanced diet, exercising regularly, limiting alcohol and stopping smoking. Regular physical exams and screening tests are another way to keep healthy. Preventive exams provided by your health care provider can find health problems before they become diseases or illnesses. Preventive services including immunizations, screening tests, monitoring and exams can help you take care of your own health. All people over age 72 should have a pneumovax  and and a prevnar shot to prevent pneumonia. These are once in a lifetime unless you and your provider decide differently. All people over 65 should have a yearly flu shot and a tetanus vaccine every 10 years. Screening for diabetes mellitus with a blood sugar test should be done every year. Glaucoma is a disease of the eye due to increased ocular pressure that can lead to blindness and it should be done every year by an eye professional. 
 
Cardiovascular screening tests that check for elevated lipids (fatty part of blood) which can lead to heart disease and strokes should be done every 5 years. Colorectal screening that evaluates for blood or polyps in your colon should be done yearly as a stool test or every five years as a flexible sigmoidoscope or every 10 years as a colonoscopy up to age 76. Men up to age 76 may need a screening blood test for prostate cancer at certain intervals, depending on their personal and family history. This decision is between the patient and his provider. If you have been a smoker or had family history of abdominal aortic aneurysms, you and your provider may decide to schedule an ultrasound test of your aorta. Hepatitis C screening is also recommended for anyone born between [birth year] through [address]. A shingles vaccine is also recommended once in a lifetime after age 61. Your Medicare Wellness Exam is recommended annually. Here is a list of your current Health Maintenance items with a due date: 
Health Maintenance Due Topic Date Due  Shingles Vaccine  01/19/2012  Glaucoma Screening   03/19/2017  Pneumococcal Vaccine (1 of 2 - PCV13) 03/19/2017 [employer] Annual Well Visit  03/19/2017 Learning About Low-Fat Eating What is low-fat eating? Most food has some fat in it. Your body needs some fat to be healthy. But some kinds of fats are healthier than others. In a low-fat eating plan, you try to choose healthier fats and eat fewer unhealthy fats. Healthy fats include olive and canola oil. Try to avoid eating too much saturated fat (such as in cheese and meats) and trans fat (a type of fat found in many packaged snack foods and other baked goods). You do not need to cut all fat from your diet.  But you can make healthier choices about the types and amount of fat you eat. Even though it is a good idea to choose healthier fats, it is still important to be careful of how much fat you eat, because all fats are high in calories. What are the different types of fats? Unhealthy fats · Saturated fat. Saturated fats are mostly in animal foods, such as meat and dairy foods. Tropical oils, such as coconut oil, palm oil, and cocoa butter, are also saturated fats. · Trans fat. Trans fats include shortening, partially hydrogenated vegetable oils, and hydrogenated vegetable oils. Trans fats are made when a liquid fat is turned into a solid fat (for example, when corn oil is made into stick margarine). They are in many processed foods, such as cookies, crackers, and snack foods. Healthy fats · Monounsaturated fat. Monounsaturated fats are liquid at room temperature but get solid when refrigerated. Eating foods that are high in this fat may help lower your \"bad\" (LDL) cholesterol, keep your \"good\" (HDL) cholesterol level up, and lower your chances of getting heart disease. This fat is found in canola oil, olive oil, peanut oil, olives, avocados, nuts, and nut butters. · Polyunsaturated fat. Polyunsaturated fats are liquid at room temperature. They are in safflower, sunflower, and corn oils. They are also the main fat in seafood. Omega-3 fatty acids are types of polyunsaturated fat. Omega-3 fatty acids may lower your chances of getting heart disease. Fatty fish such as salmon and mackerel contain these healthy fatty acids. So do ground flaxseeds and flaxseed oil, soybeans, walnuts, and seeds. Why cut down on unhealthy fats? Eating foods that contain saturated fats can raise the LDL (\"bad\") cholesterol in your blood. Having a high level of LDL cholesterol increases your chance of hardening of the arteries (atherosclerosis), which can lead to heart disease, heart attack, and stroke. Trans fat raises the level of \"bad\" LDL cholesterol in your blood and may lower the \"good\" HDL cholesterol in your blood. Trans fat can raise your risk of heart disease, heart attack, and stroke. In general: · No more than 10% of your daily calories should come from saturated fat. This is about 20 grams in a 2,000-calorie diet. · No more than 10% of your daily calories should come from polyunsaturated fat. This is about 20 grams in a 2,000-calorie diet. · Monounsaturated fats can be up to 15% of your daily calories. This is about 25 to 30 grams in a 2,000-calorie diet. If you're not sure how much fat you should be eating or how many calories you need each day to stay at a healthy weight, talk to a registered dietitian. He or she can help you create a plan that's right for you. What can you do to cut down on fat? Foods like cheese, butter, sausage, and desserts can have a lot of unhealthy fats. Try these tips for healthier meals at home and when you eat out. At home · Fill up on fruits, vegetables, and whole grains. · Think of meat as a side dish instead of as the main part of your meal. 
· When you do eat meat, make it extra-lean ground beef (97% lean), ground turkey breast (without skin added), meats with fat trimmed off before cooking, or skinless chicken. · Try main dishes that use whole wheat pasta, brown rice, dried beans, or vegetables. · Use cooking methods that use little or no fat, such as broiling, steaming, or grilling. Use cooking spray instead of oil. If you use oil, use a monounsaturated oil, such as canola or olive oil. · Read food labels on canned, bottled, or packaged foods. Choose those with little saturated fat and no trans fat. When eating out at a restaurant · Order foods that are broiled or poached instead of fried or breaded. · Cut back on the amount of butter or margarine that you use on bread. Use small amounts of olive oil instead. · Order sauces, gravies, and salad dressings on the side, and use only a little. · When you order pasta, choose tomato sauce instead of cream sauce. · Ask for salsa with your baked potato instead of sour cream, butter, cheese, or jorge. Where can you learn more? Go to http://yady-joo.info/. Enter T265 in the search box to learn more about \"Learning About Low-Fat Eating. \" Current as of: May 12, 2017 Content Version: 11.4 © 9373-9709 [employer]. Care instructions adapted under license by [employer] (which disclaims liability or warranty for this information). If you have questions about a medical condition or this instruction, always ask your healthcare professional. [address] any warranty or liability for your use of this information. Introducing [facility]! William Tello introduces webme patient portal. Now you can access parts of your medical record, email your doctor's office, and request medication refills online. 1. In your internet browser, go to [email or web address] 2. Click on the First Time User? Click Here link in the Sign In box. You will see the New Member Sign Up page. 3. Enter your webme Access Code exactly as it appears below. You will not need to use this code after youve completed the sign-up process. If you do not sign up before the expiration date, you must request a new code. · webme Access Code: [email or web address] Expires: 5/2/2018  9:38 AM 
 
4. Enter the last four digits of your Social Security Number (xxxx) and Date of Birth (mm/dd/yyyy) as indicated and click Submit. You will be taken to the next sign-up page. 5. Create a webme ID. This will be your webme login ID and cannot be changed, so think of one that is secure and easy to remember. 6. Create a [employer] password. You can change your password at any time. 7. Enter your Password Reset Question and Answer. This can be used at a later time if you forget your password. 8. Enter your e-mail address. You will receive e-mail notification when new information is available in [address] 9. Click Sign Up. You can now view and download portions of your medical record. 10. Click the Download Summary menu link to download a portable copy of your medical information. If you have questions, please visit the Frequently Asked Questions section of the [employer] website. Remember, [employer] is NOT to be used for urgent needs. For medical emergencies, dial 911. Now available from your iPhone and Android! Please provide this summary of care documentation to your next provider. Your primary care clinician is listed as Regi Archibald. If you have any questions after today's visit, please call [phone].

## 2018-06-12 NOTE — PROGRESS NOTES
This is a \"Welcome to [employer]"  Initial Preventive Physical Examination (IPPE) providing Personalized Prevention Plan Services (Performed in the first 12 months of enrollment)    I have reviewed the patient's medical history in detail and updated the computerized patient record. 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 Blood work done last month showed minorly elevated liver enzyme. Past Medical History:   Diagnosis Date    Colonic polyp       Past Surgical History:   Procedure Laterality Date    HX APPENDECTOMY      HX CHOLECYSTECTOMY      HX COLONOSCOPY       Current Outpatient Prescriptions   Medication Sig Dispense Refill    AFLURIA QUAD [id number], PF, syrg injection inject 0.5 milliliter intramuscularly  0     No Known Allergies  Family History   Problem Relation Age of Onset    Cancer Father      Social History   Substance Use Topics    Smoking status: Former Smoker     Packs/day: 1.25     Types: Cigarettes     Start date: 2/1/1971     Quit date: 6/16/1979    Smokeless tobacco: Never Used    Alcohol use 16.8 oz/week     14 Standard drinks or equivalent, 14 Shots of liquor per week     Diet, Lifestyle: No special diet    Exercise level: sedentary    Depression Risk Screen     PHQ over the last two weeks 2/1/2018   Little interest or pleasure in doing things Not at all   Feeling down, depressed or hopeless Not at all   Total Score PHQ 2 0     Alcohol Risk Screen     He drinks 2 drinks of whiskey daily, not really able to tell me how many ounces whiskey he uses. Functional Ability and Level of Safety   Hearing Loss  Hearing is good. Vision Screening  Vision is good. Visual Acuity Screening    Right eye Left eye Both eyes   Without correction:      With correction: 20/20 20/25 20/20         Activities of Daily Living  The home contains: no safety equipment.   Patient does total self care    Fall Risk Screen  Fall Risk Assessment, last 12 mths 2/1/2018 Able to walk? Yes   Fall in past 12 months? No       Abuse Screen  Patient is not abused    Screening EKG   EKG order placed: No    Patient Care Team   Patient Care Team:  Kecia Kirby MD as PCP - General (Family Practice)     End of Life Planning   Advanced care planning directives were not discussed with the patient and/or family/caregiver. Assessment/Plan   Education and counseling provided:  Pneumococcal Vaccine      ICD-10-CM ICD-9-CM    1. Welcome to Medicare preventive visit Z00.00 V70.0 ADMIN PNEUMOCOCCAL VACCINE      AMB POC URINALYSIS DIP STICK AUTO W/O MICRO   2. Encounter for immunization Z23 V03.89 PNEUMOCOCCAL CONJ VACCINE 13 VALENT IM      ADMIN PNEUMOCOCCAL VACCINE      AMB POC URINALYSIS DIP STICK AUTO W/O MICRO   3. Screening for alcoholism Z13.89 V79.1 ME ANNUAL ALCOHOL SCREEN 15 MIN      ADMIN PNEUMOCOCCAL VACCINE      AMB POC URINALYSIS DIP STICK AUTO W/O MICRO   4. Screening for depression Z13.89 V79.0 DEPRESSION SCREEN ANNUAL      ADMIN PNEUMOCOCCAL VACCINE      AMB POC URINALYSIS DIP STICK AUTO W/O MICRO   5. Screening for diabetes mellitus Z13.1 V77.1 ADMIN PNEUMOCOCCAL VACCINE      AMB POC URINALYSIS DIP STICK AUTO W/O MICRO   6. Screening for ischemic heart disease Z13.6 V81.0 ADMIN PNEUMOCOCCAL VACCINE      AMB POC URINALYSIS DIP STICK AUTO W/O MICRO      CANCELED: LIPID PANEL   7. 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  Elevated liver enzymes R42.9 652.0 METABOLIC PANEL, COMPREHENSIVE      HEPATITIS C AB      HEP B SURFACE AG      ADMIN PNEUMOCOCCAL VACCINE      AMB POC URINALYSIS DIP STICK AUTO W/O MICRO   9. Elevated cholesterol E78.00 272.0 LIPID PANEL      ADMIN PNEUMOCOCCAL VACCINE      AMB POC URINALYSIS DIP STICK AUTO W/O MICRO         Health Maintenance Due   Topic Date Due    ZOSTER VACCINE AGE 60>  01/19/2012    GLAUCOMA SCREENING Q2Y  03/19/2017    Pneumococcal 65+ Low/Medium Risk (1 of 2 - PCV13) 03/19/2017    MEDICARE YEARLY EXAM  03/19/2017     Orders Placed This Encounter    Depression Screen Annual    Pneumococcal Conjugate vaccine - 13 valent (50 years and older)    LIPID PANEL     Standing Status:   Future     Standing Expiration Date:   7/4/2901    METABOLIC PANEL, COMPREHENSIVE     Standing Status:   Future     Standing Expiration Date:   8/4/2018    HEPATITIS C AB     Standing Status:   Future     Standing Expiration Date:   8/2/2018    HEP B SURFACE AG     Standing Status:   Future     Standing Expiration Date:   8/2/2018    AMB POC URINALYSIS DIP STICK AUTO W/O MICRO    Annual  Alcohol Screen 15 min ()    ADMIN PNEUMOCOCCAL VACCINE    AFLURIA QUAD [id number], PF, syrg injection     Sig: inject 0.5 milliliter intramuscularly     Refill:  0     Having had blood work done recently does not want to do that again does agree to have the labs done prior to his next visit. We did discuss reducing his alcohol usage. Follow-up Disposition:  Return in about 6 months (around 8/1/2018). DOT PHYSICAL    Duane TAPAN Lowedebbie 74 y.o. presents for a DOT physical.  He has the following concerns: No complaints. No dizziness, syncope or fainting, exertional chest pain, excessive shortness of breath, focal weakness, abdominal pain, severe depressed mood, thoughts of suicide, recreational drug abuse, excessive alcohol usage. No excessive sleepiness in the day time falling asleep at the wheel or any motor vehicle accidents. No diabetes. No insulin or narcotic agents.     Patient Active Problem List   Diagnosis Code    Hyperlipidemia E78.5     Past Medical History:   Diagnosis Date    Colonic polyp      Past Surgical History:   Procedure Laterality Date    HX APPENDECTOMY      HX CHOLECYSTECTOMY      HX COLONOSCOPY       Social History     Social History    Marital status:      Spouse name: N/A    Number of children: 2    Years of education: N/A     Occupational History    farming      Social History Main Topics    Smoking status: Former Smoker     Packs/day: 1.25     Types: Cigarettes     Start date: 2/1/1971     Quit date: 6/16/1979    Smokeless tobacco: Never Used    Alcohol use 16.8 oz/week     14 Standard drinks or equivalent, 14 Shots of liquor per week    Drug use: No    Sexual activity: Yes     Partners: Female     Other Topics Concern    Not on file     Social History Narrative    Lives with wife       Current Outpatient Prescriptions   Medication Sig Dispense Refill    AFLURIA QUAD [id number], PF, syrg injection inject 0.5 milliliter intramuscularly  0         Results for orders placed or performed in visit on 01/11/18   LIPID PANEL   Result Value Ref Range    Cholesterol, total 263 (H) 100 - 199 mg/dL    Triglyceride 140 0 - 149 mg/dL    HDL Cholesterol 63 >39 mg/dL    VLDL, calculated 28 5 - 40 mg/dL    LDL, calculated 172 (H) 0 - 99 mg/dL   METABOLIC PANEL, COMPREHENSIVE   Result Value Ref Range    Glucose 91 65 - 99 mg/dL    BUN 11 8 - 27 mg/dL    Creatinine 0.93 0.76 - 1.27 mg/dL    GFR est non-AA 86 >59 mL/min/1.73    GFR est AA 99 >59 mL/min/1.73    BUN/Creatinine ratio 12 10 - 24    Sodium 141 134 - 144 mmol/L    Potassium 4.6 3.5 - 5.2 mmol/L    Chloride 101 96 - 106 mmol/L    CO2 25 18 - 29 mmol/L    Calcium 9.3 8.6 - 10.2 mg/dL    Protein, total 7.0 6.0 - 8.5 g/dL    Albumin 4.4 3.6 - 4.8 g/dL    GLOBULIN, TOTAL 2.6 1.5 - 4.5 g/dL    A-G Ratio 1.7 1.2 - 2.2    Bilirubin, total 0.5 0.0 - 1.2 mg/dL    Alk. phosphatase 82 39 - 117 IU/L    AST (SGOT) 24 0 - 40 IU/L    ALT (SGPT) 45 (H) 0 - 44 IU/L   CVD REPORT   Result Value Ref Range    INTERPRETATION Note          ROS: 12 point system revived,please see HPI for pertinent positives.       OBJECTIVE:     Visit Vitals    /68 (BP 1 Location: Left arm, BP Patient Position: At rest)    Pulse 70    Temp 97.3 °F (36.3 °C) (Oral)    Resp 20 done  Ht 5' 9\" (1.753 m)    Wt 231 lb (104.8 kg)    SpO2 99%    BMI 34.11 kg/m2       Vision meet standards and hearing test good. Visual Acuity Screening    Right eye Left eye Both eyes   Without correction:      With correction: 20/20 20/25 20/20          Physical Examination:    General appearance - alert, well appearing, and in no distress. HEENT  PERRLA, EOMI, Sclera clear, anicteric, Oropharynx clear, no lesions. Chest - RRR S1, S2 heard, no peripheral edema. Lungs- Clear to auscultation, no wheezes, rales or rhonchi, has symmetric air entry    Neck- Supple, No adenopathy. Neuro- CN 2 to 11 grossly normal, No neuro deficits              DTR 2/4, strength on upper/lower ext 5/5 emily    Abdomen/groin- ND,NT, No hernia    ROM: Good ROM of upper and lower extremities. Feet normal      Psy- Mood and Affect WNL      ASSESSMENT/PLAN         Healthy appearing person. Advised routine care with PCP. 2 yr DOT card with no restrictions given. DOT papers kept in file and \[facility]\"" web site updated. I have discussed the diagnosis with the patient and the intended plan as seen in the above orders. The patient has received an after-visit summary and questions were answered concerning future plans. I have discussed medication side effects and warnings with the patient as well. Pt verbalizes understanding.

## 2019-02-12 ENCOUNTER — TELEPHONE (OUTPATIENT)
Dept: INTERNAL MEDICINE CLINIC | Age: 69
End: 2019-02-12

## 2019-02-12 DIAGNOSIS — E78.00 ELEVATED CHOLESTEROL: ICD-10-CM

## 2019-02-12 DIAGNOSIS — Z13.1 SCREENING FOR DIABETES MELLITUS: Primary | ICD-10-CM

## 2019-02-12 NOTE — TELEPHONE ENCOUNTER
Pt has a physical on 2/20/19 and would like to know if bloodwork is needed. If so, please call him at [phone]. He would like to get the test done tomorrow morning if possible.

## 2019-02-14 LAB
BUN SERPL-MCNC: 11 MG/DL (ref 8–27)
BUN/CREAT SERPL: 11 (ref 10–24)
CALCIUM SERPL-MCNC: 9.5 MG/DL (ref 8.6–10.2)
CHLORIDE SERPL-SCNC: 104 MMOL/L (ref 96–106)
CHOLEST SERPL-MCNC: 245 MG/DL (ref 100–199)
CO2 SERPL-SCNC: 25 MMOL/L (ref 20–29)
CREAT SERPL-MCNC: 1.02 MG/DL (ref 0.76–1.27)
GLUCOSE SERPL-MCNC: 94 MG/DL (ref 65–99)
HDLC SERPL-MCNC: 63 MG/DL
INTERPRETATION, 910389: NORMAL
LDLC SERPL CALC-MCNC: 144 MG/DL (ref 0–99)
POTASSIUM SERPL-SCNC: 4.6 MMOL/L (ref 3.5–5.2)
SODIUM SERPL-SCNC: 145 MMOL/L (ref 134–144)
TRIGL SERPL-MCNC: 189 MG/DL (ref 0–149)
VLDLC SERPL CALC-MCNC: 38 MG/DL (ref 5–40)

## 2019-02-18 NOTE — TELEPHONE ENCOUNTER
Send normal/stable results letter. Your results are normal/stable. If not signed up, consider getting my chart to get your results on-line. We can help you to sign up. Please follow low cholesterol diet, enclosed. Your cholesterol is higher than we like. Elevated cholesterol increases your risk of strokes and heart attacks, Please follow a low cholesterol diet to decrease your levels. If they do not improve we may consider putting you on medicines, like Lipitor. Cholesterol level has improved.

## 2019-02-20 ENCOUNTER — OFFICE VISIT (OUTPATIENT)
Dept: INTERNAL MEDICINE CLINIC | Age: 69
End: 2019-02-20

## 2019-02-20 VITALS
WEIGHT: 244 LBS | BODY MASS INDEX: 36.14 KG/M2 | RESPIRATION RATE: 16 BRPM | TEMPERATURE: 97.5 F | SYSTOLIC BLOOD PRESSURE: 136 MMHG | HEIGHT: 69 IN | DIASTOLIC BLOOD PRESSURE: 84 MMHG | OXYGEN SATURATION: 95 % | HEART RATE: 70 BPM

## 2019-02-20 DIAGNOSIS — Z13.1 SCREENING FOR DIABETES MELLITUS: Primary | ICD-10-CM

## 2019-02-20 DIAGNOSIS — Z13.31 SCREENING FOR DEPRESSION: ICD-10-CM

## 2019-02-20 DIAGNOSIS — Z13.39 SCREENING FOR ALCOHOLISM: ICD-10-CM

## 2019-02-20 DIAGNOSIS — Z23 ENCOUNTER FOR IMMUNIZATION: ICD-10-CM

## 2019-02-20 DIAGNOSIS — E78.5 HYPERLIPIDEMIA, UNSPECIFIED HYPERLIPIDEMIA TYPE: ICD-10-CM

## 2019-02-20 DIAGNOSIS — Z00.00 MEDICARE ANNUAL WELLNESS VISIT, SUBSEQUENT: ICD-10-CM

## 2019-02-20 PROBLEM — E66.01 SEVERE OBESITY (HCC): Status: ACTIVE | Noted: 2019-02-20

## 2019-02-20 NOTE — PROGRESS NOTES
This is the Subsequent Medicare Annual Wellness Exam, performed 12 months or more after the Initial AWV or the last Subsequent AWV I have reviewed the patient's medical history in detail and updated the computerized patient record. 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  
Diagnosis Date  Colonic polyp Past Surgical History:  
Procedure Laterality Date  HX APPENDECTOMY  HX CHOLECYSTECTOMY  HX COLONOSCOPY Current Outpatient Medications Medication Sig Dispense Refill  AFLURIA QUAD [id number], PF, syrg injection inject 0.5 milliliter intramuscularly  0 No Known Allergies Family History Problem Relation Age of Onset  Cancer Father Social History Tobacco Use  Smoking status: Former Smoker Packs/day: 1.25 Types: Cigarettes Start date: 1971 Last attempt to quit: 1979 Years since quittin.7  Smokeless tobacco: Never Used Substance Use Topics  Alcohol use: Yes Alcohol/week: 16.8 oz Types: 14 Shots of liquor, 14 Standard drinks or equivalent per week Patient Active Problem List  
Diagnosis Code  Hyperlipidemia E78.5  Severe obesity (HCC) E66.01 Depression Risk Factor Screening:  
 
3 most recent PHQ Screens 2019 Little interest or pleasure in doing things Several days Feeling down, depressed, irritable, or hopeless Several days Total Score PHQ 2 2 Alcohol Risk Factor Screening: You average more than 14 drinks a week. Usu just 2 per night Functional Ability and Level of Safety:  
Hearing Loss Hearing is good. Activities of Daily Living The home contains: no safety equipment. Patient does total self care Fall Risk Fall Risk Assessment, last 12 mths 2019 Able to walk? Yes Fall in past 12 months? No  
 
 
Abuse Screen Patient is not abused Cognitive Screening Evaluation of Cognitive Function: Has your family/caregiver stated any concerns about your memory: no 
Abnormal, Clock Drawing test 
 
Patient Care Team  
Patient Care Team: 
Nicky Kaur MD as PCP - [facility]) Assessment/Plan Education and counseling provided: 
Are appropriate based on today's review and evaluation Pneumococcal Vaccine Influenza Vaccine We discussed a screening exam STI and AAA screen and the  the pros and cons of having the test done. The patient made a decision to do the test: yes and dec all Diagnoses and all orders for this visit: 
 
1. Medicare annual wellness visit, subsequent 2. Screening for alcoholism -     [place] ANNUAL ALCOHOL SCREEN 15 MIN 3. Screening for depression 
-     Rolf Varma 4. Encounter for immunization -     ADMIN PNEUMOCOCCAL VACCINE 
-     PNEUMOCOCCAL POLYSACCHARIDE VACCINE, 23-VALENT, ADULT OR IMMUNOSUPPRESSED PT DOSE, Health Maintenance Due Topic Date Due  Shingrix Vaccine Age 50> (1 of 2) 03/19/2002  GLAUCOMA SCREENING Q2Y  03/19/2017  Pneumococcal 65+ Low/Medium Risk (2 of 2 - PPSV23) 02/01/2019  MEDICARE YEARLY EXAM  02/02/2019 Could not give vaccines, not available. Will give the next time he shows up. Can get a nursing appt to get flu and pneum 23 Follow-up Disposition: 
Return in about 6 months (around 8/20/2019).

## 2019-02-20 NOTE — PROGRESS NOTES
Chief Complaint Patient presents with [facility] Annual Wellness Visit I have reviewed the patient's medical history in detail and updated the computerized patient record. Health Maintenance reviewed. 1. Have you been to the ER, urgent care clinic since your last visit? Hospitalized since your last visit?no 2. Have you seen or consulted any other health care providers outside of the [address] [place] [address] since your last visit? Include any pap smears or colon screening. No 
 
 
Encouraged pt to discuss pt's wishes with spouse/partner/family and bring them in the next appt to follow thru with the Advanced Directive Fall Risk Assessment, last 12 mths 2/20/2019 Able to walk? Yes Fall in past 12 months? No  
 
 
3 most recent PHQ Screens 2/20/2019 Little interest or pleasure in doing things Several days Feeling down, depressed, irritable, or hopeless Several days Total Score PHQ 2 2 Abuse Screening Questionnaire 2/20/2019 Do you ever feel afraid of your partner? Aselinoh Jean Are you in a relationship with someone who physically or mentally threatens you? Asequincy Pena Is it safe for you to go home? Y  
 
 

## 2019-02-20 NOTE — PATIENT INSTRUCTIONS
Medicare Wellness Visit, Male The best way to live healthy is to have a lifestyle where you eat a well-balanced diet, exercise regularly, limit alcohol use, and quit all forms of tobacco/nicotine, if applicable. Regular preventive services are another way to keep healthy. Preventive services (vaccines, screening tests, monitoring & exams) can help personalize your care plan, which helps you manage your own care. Screening tests can find health problems at the earliest stages, when they are easiest to treat. [address] [facility] follows the current, evidence-based guidelines published by the [facility] Suresh Hazel ([facility]) when recommending preventive services for our patients. Because we follow these guidelines, sometimes recommendations change over time as research supports it. (For example, a prostate screening blood test is no longer routinely recommended for men with no symptoms.) Of course, you and your doctor may decide to screen more often for some diseases, based on your risk and co-morbidities (chronic disease you are already diagnosed with). Preventive services for you include: - Medicare offers their members a free annual wellness visit, which is time for you and your primary care provider to discuss and plan for your preventive service needs. Take advantage of this benefit every year! 
-All adults over age 72 should receive the recommended pneumonia vaccines. Current USPSTF guidelines recommend a series of two vaccines for the best pneumonia protection.  
-All adults should have a flu vaccine yearly and an ECG.  All adults age 61 and older should receive a shingles vaccine once in their lifetime.   
-All adults age 38-68 who are overweight should have a diabetes screening test once every three years.  
-Other screening tests & preventive services for persons with diabetes include: an eye exam to screen for diabetic retinopathy, a kidney function test, a foot exam, and stricter control over your cholesterol.  
-Cardiovascular screening for adults with routine risk involves an electrocardiogram (ECG) at intervals determined by the provider.  
-Colorectal cancer screening should be done for adults age 54-65 with no increased risk factors for colorectal cancer. There are a number of acceptable methods of screening for this type of cancer. Each test has its own benefits and drawbacks. Discuss with your provider what is most appropriate for you during your annual wellness visit. The different tests include: colonoscopy (considered the best screening method), a fecal occult blood test, a fecal DNA test, and sigmoidoscopy. 
-All adults born between [facility] and [facility] should be screened once for Hepatitis C. 
-An Abdominal Aortic Aneurysm (AAA) Screening is recommended for men age 73-68 who has ever smoked in their lifetime. Here is a list of your current Health Maintenance items (your personalized list of preventive services) with a due date: 
Health Maintenance Due Topic Date Due  Shingles Vaccine (1 of 2) 03/19/2002  Glaucoma Screening   03/19/2017  Pneumococcal Vaccine (2 of 2 - PPSV23) 02/01/2019 [facility] Annual Well Visit  02/02/2019

## 2019-03-12 ENCOUNTER — TELEPHONE (OUTPATIENT)
Dept: INTERNAL MEDICINE CLINIC | Age: 69
End: 2019-03-12

## 2019-03-12 ENCOUNTER — CLINICAL SUPPORT (OUTPATIENT)
Dept: INTERNAL MEDICINE CLINIC | Age: 69
End: 2019-03-12

## 2019-03-12 VITALS
HEART RATE: 69 BPM | SYSTOLIC BLOOD PRESSURE: 152 MMHG | BODY MASS INDEX: 36.03 KG/M2 | TEMPERATURE: 97.3 F | DIASTOLIC BLOOD PRESSURE: 86 MMHG | RESPIRATION RATE: 18 BRPM | HEIGHT: 69 IN | OXYGEN SATURATION: 97 %

## 2019-03-12 DIAGNOSIS — Z23 ENCOUNTER FOR IMMUNIZATION: ICD-10-CM

## 2019-03-12 RX ORDER — SILDENAFIL 100 MG/1
100 TABLET, FILM COATED ORAL AS NEEDED
Qty: 5 TAB | Refills: 5 | Status: SHIPPED | OUTPATIENT
Start: 2019-03-12 | End: 2019-08-20 | Stop reason: ALTCHOICE

## 2019-03-12 NOTE — PROGRESS NOTES
Chief Complaint   Patient presents with    Immunization/Injection     PREVNAR 13 AND FLU     Immunization/s administered 3/12/2019 by Judith Naranjo LPN. Patient tolerated procedure well. No reactions noted.

## 2019-03-12 NOTE — TELEPHONE ENCOUNTER
Requested Prescriptions     Pending Prescriptions Disp Refills    sildenafil citrate (VIAGRA) 100 mg tablet 5 Tab 5     Sig: Take 1 Tab by mouth as needed.      Last Office Visit  02.20.2019  Last Refill  49.02.60

## 2019-03-12 NOTE — TELEPHONE ENCOUNTER
Chief Complaint   Patient presents with    Medication Refill     VIAGARA     Receipt of Rx confirmed by Kb Gordillo, pharmacist.   verified. Patient notified of Rx electronically sent to pharmacy.

## 2019-04-04 ENCOUNTER — OFFICE VISIT (OUTPATIENT)
Dept: INTERNAL MEDICINE CLINIC | Age: 69
End: 2019-04-04

## 2019-04-04 VITALS
HEIGHT: 69 IN | RESPIRATION RATE: 16 BRPM | TEMPERATURE: 97.9 F | SYSTOLIC BLOOD PRESSURE: 142 MMHG | HEART RATE: 74 BPM | WEIGHT: 245 LBS | BODY MASS INDEX: 36.29 KG/M2 | DIASTOLIC BLOOD PRESSURE: 95 MMHG | OXYGEN SATURATION: 95 %

## 2019-04-04 DIAGNOSIS — E78.5 HYPERLIPIDEMIA, UNSPECIFIED HYPERLIPIDEMIA TYPE: ICD-10-CM

## 2019-04-04 DIAGNOSIS — M10.472 ACUTE GOUT DUE TO OTHER SECONDARY CAUSE INVOLVING TOE OF LEFT FOOT: Primary | ICD-10-CM

## 2019-04-04 RX ORDER — COLCHICINE 0.6 MG/1
0.6 TABLET ORAL DAILY
Qty: 10 TAB | Refills: 2 | Status: SHIPPED | OUTPATIENT
Start: 2019-04-04 | End: 2019-08-20 | Stop reason: ALTCHOICE

## 2019-04-04 RX ORDER — NAPROXEN 500 MG/1
500 TABLET ORAL 2 TIMES DAILY WITH MEALS
Qty: 60 TAB | Refills: 1 | Status: SHIPPED | OUTPATIENT
Start: 2019-04-04 | End: 2019-08-20 | Stop reason: ALTCHOICE

## 2019-04-04 NOTE — PROGRESS NOTES
Chief Complaint Patient presents with  Toe Pain L/Great toe I have reviewed the patient's medical history in detail and updated the computerized patient record. Health Maintenance reviewed. 1. Have you been to the ER, urgent care clinic since your last visit? Hospitalized since your last visit?no 2. Have you seen or consulted any other health care providers outside of the [address] [place] [address] since your last visit? Include any pap smears or colon screening. No 
 
Encouraged pt to discuss pt's wishes with spouse/partner/family and bring them in the next appt to follow thru with the Advanced Directive Fall Risk Assessment, last 12 mths 4/4/2019 Able to walk? Yes Fall in past 12 months? No  
 
 
3 most recent PHQ Screens 4/4/2019 Little interest or pleasure in doing things Several days Feeling down, depressed, irritable, or hopeless Several days Total Score PHQ 2 2 Abuse Screening Questionnaire 2/20/2019 Do you ever feel afraid of your partner? Dwayne Jimenez Are you in a relationship with someone who physically or mentally threatens you? Dwayne Jimenez Is it safe for you to go home? Y  
 
 

## 2019-04-04 NOTE — PROGRESS NOTES
HISTORY OF PRESENT ILLNESS Meka Adamson is a 81 y.o. male. Toe Pain The history is provided by the patient. This is a new problem. Episode onset: 1 week. The problem occurs constantly. The problem has been gradually worsening. The pain is present in the left toes (big). The quality of the pain is described as aching. Exacerbated by: pipe hit it week ago. There has been a history of trauma. Not a heavy pipe, no Hx of gout, started hurting few hours after impact. Patient Active Problem List  
Diagnosis Code  Hyperlipidemia E78.5  Severe obesity (HCC) E66.01 No reg meds Review of Systems Constitutional: Negative for fever. Respiratory: Negative for shortness of breath. Cardiovascular: Negative for chest pain. Musculoskeletal: Positive for joint pain. Negative for falls. Physical Exam 
 
Visit Vitals BP (!) 142/95 Pulse 74 Temp 97.9 °F (36.6 °C) (Oral) Resp 16 Ht 5' 9\" (1.753 m) Wt 245 lb (111.1 kg) SpO2 95% BMI 36.18 kg/m² WD WN male NAD Heart RRR without murmers clicks or rubs Lungs CTA Abdo soft nontender Ext no edema Left toe metatarsal quite swollen and red, very much gout-like picture RE last lipid panal 
ASSESSMENT and PLAN Encounter Diagnoses Name Primary?  Acute gout due to other secondary cause involving toe of left foot Yes  Hyperlipidemia, unspecified hyperlipidemia type Orders Placed This Encounter  naproxen (NAPROSYN) 500 mg tablet  colchicine (COLCRYS) 0.6 mg tablet See patient instructions, went over them personally with the patient. Emphasized compliance. Questions answered. Patient states that they understand the plan of action and will call if there are any issues or misunderstandings.

## 2019-04-04 NOTE — PATIENT INSTRUCTIONS
Purine-Restricted Diet: Care Instructions Your Care Instructions Purines are substances that are found in some foods. Your body turns purines into uric acid. High levels of uric acid can cause gout, which is a form of arthritis that causes pain and inflammation in joints. You may be able to help control the amount of uric acid in your body by limiting high-purine foods in your diet. Follow-up care is a key part of your treatment and safety. Be sure to make and go to all appointments, and call your doctor if you are having problems. It's also a good idea to know your test results and keep a list of the medicines you take. How can you care for yourself at home? · Plan your meals and snacks around foods that are low in purines and are safe for you to eat. These foods include: ? Green vegetables and tomatoes. ? Fruits. ? Whole-grain breads, rice, and cereals. ? Eggs, peanut butter, and nuts. ? Low-fat milk, cheese, and other milk products. ? Popcorn. ? Gelatin desserts, chocolate, cocoa, and cakes and sweets, in small amounts. · You can eat certain foods that are medium-high in purines, but eat them only once in a while. These foods include: 
? Legumes, such as dried beans and dried peas. You can have 1 cup cooked legumes each day. ? Asparagus, cauliflower, spinach, mushrooms, and green peas. ? Fish and seafood (other than very high-purine seafood). ? Oatmeal, wheat bran, and wheat germ. · Limit very high-purine foods, including: 
? Organ meats, such as liver, kidneys, sweetbreads, and brains. ? Meats, including jorge, beef, pork, and lamb. ? Game meats and any other meats in large amounts. ? Anchovies, sardines, herring, mackerel, and scallops. ? Gravy. ? Beer. Where can you learn more? Go to http://yady-joo.info/. Enter F448 in the search box to learn more about \"Purine-Restricted Diet: Care Instructions. \" Current as of: March 28, 2018 Content Version: 11.9 © 5553-3108 [employer]. Care instructions adapted under license by [employer] (which disclaims liability or warranty for this information). If you have questions about a medical condition or this instruction, always ask your healthcare professional. [address] any warranty or liability for your use of this information. Gout: Care Instructions Your Care Instructions Gout is a form of arthritis caused by a buildup of uric acid crystals in a joint. It causes sudden attacks of pain, swelling, redness, and stiffness, usually in one joint, especially the big toe. Gout usually comes on without a cause. But it can be brought on by drinking alcohol (especially beer) or eating seafood and red meat. Taking certain medicines, such as diuretics or aspirin, also can bring on an attack of gout. Taking your medicines as prescribed and following up with your doctor regularly can help you avoid gout attacks in the future. Follow-up care is a key part of your treatment and safety. Be sure to make and go to all appointments, and call your doctor if you are having problems. It's also a good idea to know your test results and keep a list of the medicines you take. How can you care for yourself at home? · If the joint is swollen, put ice or a cold pack on the area for 10 to 20 minutes at a time. Put a thin cloth between the ice and your skin. · Prop up the sore limb on a pillow when you ice it or anytime you sit or lie down during the next 3 days. Try to keep it above the level of your heart. This will help reduce swelling. · Rest sore joints. Avoid activities that put weight or strain on the joints for a few days. Take short rest breaks from your regular activities during the day. · Take your medicines exactly as prescribed. Call your doctor if you think you are having a problem with your medicine. · Take pain medicines exactly as directed. ? If the doctor gave you a prescription medicine for pain, take it as prescribed. ? If you are not taking a prescription pain medicine, ask your doctor if you can take an over-the-counter medicine. · Eat less seafood and red meat. · Check with your doctor before drinking alcohol. · Losing weight, if you are overweight, may help reduce attacks of gout. But do not go on a [employer]. \" Losing a lot of weight in a short amount of time can cause a gout attack. When should you call for help? Call your doctor now or seek immediate medical care if: 
  · You have a fever.  
  · The joint is so painful you cannot use it.  
  · You have sudden, unexplained swelling, redness, warmth, or severe pain in one or more joints.  
 Watch closely for changes in your health, and be sure to contact your doctor if: 
  · You have joint pain.  
  · Your symptoms get worse or are not improving after 2 or 3 days. Where can you learn more? Go to http://yady-joo.info/. Enter C752 in the search box to learn more about \"Gout: Care Instructions. \" Current as of: Nicole 10, 2018 Content Version: 11.9 © 0650-7379 [employer]. Care instructions adapted under license by [employer] (which disclaims liability or warranty for this information). If you have questions about a medical condition or this instruction, always ask your healthcare professional. [address] any warranty or liability for your use of this information.

## 2019-08-20 ENCOUNTER — OFFICE VISIT (OUTPATIENT)
Dept: INTERNAL MEDICINE CLINIC | Age: 69
End: 2019-08-20

## 2019-08-20 VITALS
HEART RATE: 69 BPM | TEMPERATURE: 97.9 F | HEIGHT: 69 IN | RESPIRATION RATE: 16 BRPM | OXYGEN SATURATION: 95 % | DIASTOLIC BLOOD PRESSURE: 80 MMHG | BODY MASS INDEX: 34.51 KG/M2 | SYSTOLIC BLOOD PRESSURE: 128 MMHG | WEIGHT: 233 LBS

## 2019-08-20 DIAGNOSIS — Z13.220 SCREENING CHOLESTEROL LEVEL: ICD-10-CM

## 2019-08-20 DIAGNOSIS — Z13.1 SCREENING FOR DIABETES MELLITUS: ICD-10-CM

## 2019-08-20 DIAGNOSIS — M77.8 RIGHT SHOULDER TENDONITIS: Primary | ICD-10-CM

## 2019-08-20 NOTE — PROGRESS NOTES
HISTORY OF PRESENT ILLNESS  Duane Vinita Klinefelter is a 81 y.o. male. Shoulder Pain    The history is provided by the patient. Incident onset: a month ago. There was no injury mechanism. The right shoulder is affected. The pain has been fluctuating since onset. The pain radiates. Pertinent negatives include no muscle weakness. no re-occurrence of gout  No meds    No Known Allergies  Social History     Socioeconomic History    Marital status:      Spouse name: Not on file    Number of children: 2    Years of education: Not on file    Highest education level: Not on file   Occupational History    Occupation: farming   Social Needs    Financial resource strain: Not on file    Food insecurity:     Worry: Not on file     Inability: Not on file    Transportation needs:     Medical: Not on file     Non-medical: Not on file   Tobacco Use    Smoking status: Former Smoker     Packs/day: 1.25     Types: Cigarettes     Start date: 1971     Last attempt to quit: 1979     Years since quittin.2    Smokeless tobacco: Never Used   Substance and Sexual Activity    Alcohol use:  Yes     Alcohol/week: 28.0 standard drinks     Types: 14 Shots of liquor, 14 Standard drinks or equivalent per week    Drug use: No    Sexual activity: Yes     Partners: Female   Lifestyle    Physical activity:     Days per week: Not on file     Minutes per session: Not on file    Stress: Not on file   Relationships    Social connections:     Talks on phone: Not on file     Gets together: Not on file     Attends Adventism service: Not on file     Active member of club or organization: Not on file     Attends meetings of clubs or organizations: Not on file     Relationship status: Not on file    Intimate partner violence:     Fear of current or ex partner: Not on file     Emotionally abused: Not on file     Physically abused: Not on file     Forced sexual activity: Not on file   Other Topics Concern    Not on file   Social History Narrative    Lives with wife         Review of Systems   Respiratory: Negative for wheezing. Cardiovascular: Negative for chest pain. Musculoskeletal: Negative for falls. Physical Exam    Visit Vitals  /80 (BP 1 Location: Left arm, BP Patient Position: Sitting)   Pulse 69   Temp 97.9 °F (36.6 °C) (Oral)   Resp 16   Ht 5' 9\" (1.753 m)   Wt 233 lb (105.7 kg)   SpO2 95%   BMI 34.41 kg/m²     WD WN male NAD  Heart RRR without murmers clicks or rubs  Lungs CTA  Abdo soft nontender  Ext no edema  Right shoulder neg impingement  ASSESSMENT and PLAN  Encounter Diagnoses   Name Primary?     Right shoulder tendonitis Yes    Screening for diabetes mellitus     Screening cholesterol level      Orders Placed This Encounter    METABOLIC PANEL, BASIC    LIPID PANEL     Prior to next visit  Follow-up and Dispositions    · Return in about 6 months (around 2/20/2020) for medicare annual.

## 2019-08-20 NOTE — PROGRESS NOTES
Chief Complaint   Patient presents with    Shoulder Pain     R/shoulder pain x2 months     I have reviewed the patient's medical history in detail and updated the computerized patient record. Health Maintenance reviewed. 1. Have you been to the ER, urgent care clinic since your last visit? Hospitalized since your last visit?no    2. Have you seen or consulted any other health care providers outside of the [address] [place] [address] [facility] since your last visit? Include any pap smears or colon screening. No      Encouraged pt to discuss pt's wishes with spouse/partner/family and bring them in the next appt to follow thru with the Advanced Directive    Fall Risk Assessment, last 12 mths 8/20/2019   Able to walk? Yes   Fall in past 12 months? -       3 most recent PHQ Screens 8/20/2019   Little interest or pleasure in doing things Several days   Feeling down, depressed, irritable, or hopeless Several days   Total Score PHQ 2 2       Abuse Screening Questionnaire 2/20/2019   Do you ever feel afraid of your partner? N   Are you in a relationship with someone who physically or mentally threatens you? N   Is it safe for you to go home?  Y       ADL Assessment 2/20/2019   Feeding yourself No Help Needed   Getting from bed to chair No Help Needed   Getting dressed No Help Needed   Bathing or showering No Help Needed   Walk across the room (includes cane/walker) No Help Needed   Using the telphone No Help Needed   Taking your medications No Help Needed   Preparing meals No Help Needed   Managing money (expenses/bills) No Help Needed   Moderately strenuous housework (laundry) No Help Needed   Shopping for personal items (toiletries/medicines) No Help Needed   Shopping for groceries No Help Needed   Driving No Help Needed   Climbing a flight of stairs No Help Needed   Getting to places beyond walking distances No Help Needed

## 2019-12-02 ENCOUNTER — OFFICE VISIT (OUTPATIENT)
Dept: INTERNAL MEDICINE CLINIC | Age: 69
End: 2019-12-02

## 2019-12-02 VITALS
TEMPERATURE: 98.2 F | HEIGHT: 69 IN | WEIGHT: 226 LBS | OXYGEN SATURATION: 94 % | BODY MASS INDEX: 33.47 KG/M2 | HEART RATE: 83 BPM | RESPIRATION RATE: 16 BRPM | DIASTOLIC BLOOD PRESSURE: 76 MMHG | SYSTOLIC BLOOD PRESSURE: 114 MMHG

## 2019-12-02 DIAGNOSIS — L72.3 SEBACEOUS CYST: Primary | ICD-10-CM

## 2019-12-02 NOTE — PATIENT INSTRUCTIONS
Skin Abscess: After Your Visit to the Emergency Room Your Care Instructions You were seen in the emergency room because you had a skin abscess. This is a bacterial infection that forms a pocket of pus. It is also known as a \"boil. \" The doctor may have cut a small opening in the abscess so that the pus can drain out. You may have gauze in the cut so that the abscess will stay open and keep draining. You may need antibiotics. You will need to follow up with your doctor to make sure the infection has gone away. Even though you have been released from the emergency room, you still need to watch for any problems. The doctor carefully checked you. But sometimes problems can develop later. If you have new symptoms, or if your symptoms do not get better, return to the emergency room or call your doctor right away. A visit to the emergency room is only one step in your treatment. Even if you feel better, you still need to do what your doctor recommends, such as going to all suggested follow-up appointments and taking medicines exactly as directed. This will help you recover and help prevent future problems. How can you care for yourself at home? · If you were given antibiotics, take them as directed. Do not stop taking them just because you feel better. You need to take the full course of antibiotics. · Take pain medicines exactly as directed. If the doctor gave you a prescription medicine for pain, take it as prescribed. If you are not taking a prescription pain medicine, ask your doctor if you can take an over-the-counter medicine. · Apply a heating pad set on low or a hot water bottle 3 or 4 times a day for pain. Keep a cloth between the heat source and your skin. · Keep your bandage clean and dry. Change the bandage whenever it gets wet or dirty, or at least one time a day. · If the abscess was packed with gauze: 
¨ Keep follow-up appointments to have the gauze changed or removed.  If your doctor instructed you to remove the gauze, gently pull out all of the gauze when your doctor tells you to. ¨ After the gauze is removed, soak the area in warm water for 15 to 20 minutes two times a day, until the wound closes. When should you call for help? Call your doctor today if: 
· You think the abscess is getting worse. · You have a new or higher fever. · Your pain gets worse. · You have any problems with the gauze in your abscess. Where can you learn more? Go to [employer] Enter M703 in the search box to learn more about \"Skin Abscess: After Your Visit to the Emergency Room. \"  
© 2074-9380 [employer]. Care instructions adapted under license by [facility] (which disclaims liability or warranty for this information). This care instruction is for use with your licensed healthcare professional. If you have questions about a medical condition or this instruction, always ask your healthcare professional. [place] [address] any warranty or liability for your use of this information. Content Version: 9.4.29755; Last Revised: September 29, 2010

## 2019-12-02 NOTE — PROGRESS NOTES
Chief Complaint   Patient presents with    Cyst     cyst to mid back     I have reviewed the patient's medical history in detail and updated the computerized patient record. Health Maintenance reviewed. 1. Have you been to the ER, urgent care clinic since your last visit? Hospitalized since your last visit?no    2. Have you seen or consulted any other health care providers outside of the [address] [place] [address] [facility] since your last visit? Include any pap smears or colon screening. No      Encouraged pt to discuss pt's wishes with spouse/partner/family and bring them in the next appt to follow thru with the Advanced Directive    Fall Risk Assessment, last 12 mths 12/2/2019   Able to walk? Yes   Fall in past 12 months? No       3 most recent PHQ Screens 12/2/2019   Little interest or pleasure in doing things Several days   Feeling down, depressed, irritable, or hopeless Several days   Total Score PHQ 2 2       Abuse Screening Questionnaire 2/20/2019   Do you ever feel afraid of your partner? N   Are you in a relationship with someone who physically or mentally threatens you? N   Is it safe for you to go home?  Y       ADL Assessment 2/20/2019   Feeding yourself No Help Needed   Getting from bed to chair No Help Needed   Getting dressed No Help Needed   Bathing or showering No Help Needed   Walk across the room (includes cane/walker) No Help Needed   Using the telphone No Help Needed   Taking your medications No Help Needed   Preparing meals No Help Needed   Managing money (expenses/bills) No Help Needed   Moderately strenuous housework (laundry) No Help Needed   Shopping for personal items (toiletries/medicines) No Help Needed   Shopping for groceries No Help Needed   Driving No Help Needed   Climbing a flight of stairs No Help Needed   Getting to places beyond walking distances No Help Needed       [place] [facility]  OFFICE PROCEDURE PROGRESS NOTE        Chart reviewed for the following:   Racheal Ryan CAMILLA Mcarthur, have reviewed the History, Physical and updated the Allergic reactions for Duane S Coghill     TIME OUT performed immediately prior to start of procedure:   I, Osie Peabody, LPN, have performed the following reviews on [facility] prior to the start of the procedure:            * Patient was identified by name and date of birth   * Agreement on procedure being performed was verified  * Risks and Benefits explained to the patient by Dr. Carson Bledsoe  * Procedure site verified and marked as necessary  * Patient was positioned for comfort  * Consent was signed and verified     Time: 3:00PM      Date of procedure: 12/2/2019    Procedure performed by:  Paulette Acevedo MD    Provider assisted by: Sony Dockery LPN    Patient assisted by: Sony Dockery LPN    How tolerated by patient: Well    Post Procedural Pain Scale: 0/10    Comments: None    Dr. Carson Bledsoe did the procedure

## 2019-12-02 NOTE — PROGRESS NOTES
HISTORY OF PRESENT ILLNESS  Duane Farris Reaper is a 81 y.o. male. Cyst   The history is provided by the patient. This is a new problem. The current episode started more than 1 week ago. The problem occurs hourly. The problem has been gradually worsening. Pertinent negatives include no chest pain and no shortness of breath. Patient Active Problem List   Diagnosis Code    Hyperlipidemia E78.5    Severe obesity ([facility] [address] E66.01       Review of Systems   Respiratory: Negative for shortness of breath. Cardiovascular: Negative for chest pain. Social History     Socioeconomic History    Marital status:      Spouse name: Not on file    Number of children: 2    Years of education: Not on file    Highest education level: Not on file   Occupational History    Occupation: farming   Social Needs    Financial resource strain: Not on file    Food insecurity:     Worry: Not on file     Inability: Not on file    Transportation needs:     Medical: Not on file     Non-medical: Not on file   Tobacco Use    Smoking status: Former Smoker     Packs/day: 1.25     Types: Cigarettes     Start date: 1971     Last attempt to quit: 1979     Years since quittin.4    Smokeless tobacco: Never Used   Substance and Sexual Activity    Alcohol use:  Yes     Alcohol/week: 28.0 standard drinks     Types: 14 Shots of liquor, 14 Standard drinks or equivalent per week    Drug use: No    Sexual activity: Yes     Partners: Female   Lifestyle    Physical activity:     Days per week: Not on file     Minutes per session: Not on file    Stress: Not on file   Relationships    Social connections:     Talks on phone: Not on file     Gets together: Not on file     Attends Nondenominational service: Not on file     Active member of club or organization: Not on file     Attends meetings of clubs or organizations: Not on file     Relationship status: Not on file    Intimate partner violence:     Fear of current or ex partner: Not on file     Emotionally abused: Not on file     Physically abused: Not on file     Forced sexual activity: Not on file   Other Topics Concern    Not on file   Social History Narrative    Lives with wife       Physical Exam  Visit Vitals  /76 (BP 1 Location: Left arm, BP Patient Position: Sitting)   Pulse 83   Temp 98.2 °F (36.8 °C) (Oral)   Resp 16   Ht 5' 9\" (1.753 m)   Wt 226 lb (102.5 kg)   SpO2 94%   BMI 33.37 kg/m²     5 cm raised fluctuant nodule mid back sl to the right  ASSESSMENT and PLAN  Encounter Diagnoses   Name Primary?  Sebaceous cyst Yes   infected abscess    Options discussed. Discussed possible side affects and benefits of the procedure and/or medications. The patient agrees to undergo the procedure. Consent obtained. Sterile procedure followed. There were no complications and the procedure was well tolerated. Instructed patient to call me if it is ineffective or if there are any complications like increasing pain, redness or swelling. The area was cleaned with alcohol. It was numbed with  Lidocaine and ethyl chlride. A incision was made over the area. It drained pus odiferous  yes, serosanguinous fluid , yes . A pressure dressing was applied. The boil was packed  no. A wound culture was done  no. Anibiotics were given  no. The procedure was well tolerated. Abscess is gone. Cont to drain some, pressure dressing applied  See patient instructions, went over them personally with the patient. Emphasized compliance. Questions answered. Patient states that they understand the plan of action and will call if there are any issues or misunderstandings. Orders Placed This Encounter    DRAIN SKIN ABSCESS SIMPLE     Follow-up and Dispositions    · Return if symptoms worsen or fail to improve.

## 2020-01-15 LAB
BUN SERPL-MCNC: 15 MG/DL (ref 8–27)
BUN/CREAT SERPL: 15 (ref 10–24)
CALCIUM SERPL-MCNC: 9.8 MG/DL (ref 8.6–10.2)
CHLORIDE SERPL-SCNC: 101 MMOL/L (ref 96–106)
CHOLEST SERPL-MCNC: 253 MG/DL (ref 100–199)
CO2 SERPL-SCNC: 23 MMOL/L (ref 20–29)
CREAT SERPL-MCNC: 0.97 MG/DL (ref 0.76–1.27)
GLUCOSE SERPL-MCNC: 90 MG/DL (ref 65–99)
HDLC SERPL-MCNC: 67 MG/DL
INTERPRETATION, 910389: NORMAL
LDLC SERPL CALC-MCNC: 165 MG/DL (ref 0–99)
POTASSIUM SERPL-SCNC: 4.9 MMOL/L (ref 3.5–5.2)
SODIUM SERPL-SCNC: 141 MMOL/L (ref 134–144)
TRIGL SERPL-MCNC: 105 MG/DL (ref 0–149)
VLDLC SERPL CALC-MCNC: 21 MG/DL (ref 5–40)

## 2020-01-22 ENCOUNTER — OFFICE VISIT (OUTPATIENT)
Dept: INTERNAL MEDICINE CLINIC | Age: 70
End: 2020-01-22

## 2020-01-22 VITALS
BODY MASS INDEX: 33.18 KG/M2 | TEMPERATURE: 97.6 F | WEIGHT: 224 LBS | DIASTOLIC BLOOD PRESSURE: 71 MMHG | OXYGEN SATURATION: 97 % | SYSTOLIC BLOOD PRESSURE: 137 MMHG | RESPIRATION RATE: 16 BRPM | HEIGHT: 69 IN | HEART RATE: 69 BPM

## 2020-01-22 DIAGNOSIS — Z02.89 ENCOUNTER FOR OCCUPATIONAL PHYSICAL EXAMINATION: Primary | ICD-10-CM

## 2020-01-22 LAB
BILIRUB UR QL STRIP: NEGATIVE
GLUCOSE UR-MCNC: NEGATIVE MG/DL
KETONES P FAST UR STRIP-MCNC: NEGATIVE MG/DL
PH UR STRIP: 6 [PH] (ref 4.6–8)
PROT UR QL STRIP: NEGATIVE
SP GR UR STRIP: 1.02 (ref 1–1.03)
UA UROBILINOGEN AMB POC: NORMAL (ref 0.2–1)
URINALYSIS CLARITY POC: CLEAR
URINALYSIS COLOR POC: YELLOW
URINE BLOOD POC: NEGATIVE
URINE LEUKOCYTES POC: NEGATIVE
URINE NITRITES POC: NEGATIVE

## 2020-01-22 NOTE — PROGRESS NOTES
DOT PHYSICAL    Duane S Coghill 81 y.o. presents for a DOT physical.  Wants wellness. He has the following concerns: recent labs, too early for wellness. No dizziness, syncope or fainting, exertional chest pain, excessive shortness of breath, focal weakness, abdominal pain, severe depressed mood, thoughts of suicide, recreational drug abuse, excessive alcohol usage. No excessive sleepiness in the day time falling asleep at the wheel or any motor vehicle accidents. No diabetes. No insulin or narcotic agents. Patient Active Problem List   Diagnosis Code    Hyperlipidemia E78.5    Severe obesity ([facility] [address] E66.01     Past Medical History:   Diagnosis Date    Colonic polyp      Past Surgical History:   Procedure Laterality Date    HX APPENDECTOMY      HX CHOLECYSTECTOMY      HX COLONOSCOPY       Social History     Socioeconomic History    Marital status:      Spouse name: Not on file    Number of children: 2    Years of education: Not on file    Highest education level: Not on file   Occupational History    Occupation: farming   Social Needs    Financial resource strain: Not on file    Food insecurity:     Worry: Not on file     Inability: Not on file    Transportation needs:     Medical: Not on file     Non-medical: Not on file   Tobacco Use    Smoking status: Former Smoker     Packs/day: 1.25     Types: Cigarettes     Start date: 1971     Last attempt to quit: 1979     Years since quittin.6    Smokeless tobacco: Never Used   Substance and Sexual Activity    Alcohol use:  Yes     Alcohol/week: 28.0 standard drinks     Types: 14 Shots of liquor, 14 Standard drinks or equivalent per week    Drug use: No    Sexual activity: Yes     Partners: Female   Lifestyle    Physical activity:     Days per week: Not on file     Minutes per session: Not on file    Stress: Not on file   Relationships    Social connections:     Talks on phone: Not on file     Gets together: Not on file     Attends Denominational service: Not on file     Active member of club or organization: Not on file     Attends meetings of clubs or organizations: Not on file     Relationship status: Not on file    Intimate partner violence:     Fear of current or ex partner: Not on file     Emotionally abused: Not on file     Physically abused: Not on file     Forced sexual activity: Not on file   Other Topics Concern    Not on file   Social History Narrative    Lives with wife       Current Outpatient Medications   Medication Sig Dispense Refill    AFLURIA QUAD [id number], PF, syrg injection inject 0.5 milliliter intramuscularly  0         Results for orders placed or performed in visit on 01/22/20   AMB POC URINALYSIS DIP STICK AUTO W/O MICRO   Result Value Ref Range    Color (UA POC) Yellow     Clarity (UA POC) Clear     Glucose (UA POC) Negative Negative    Bilirubin (UA POC) Negative Negative    Ketones (UA POC) Negative Negative    Specific gravity (UA POC) 1.025 1.001 - 1.035    Blood (UA POC) Negative Negative    pH (UA POC) 6.0 4.6 - 8.0    Protein (UA POC) Negative Negative    Urobilinogen (UA POC) 0.2 mg/dL 0.2 - 1    Nitrites (UA POC) Negative Negative    Leukocyte esterase (UA POC) Negative Negative         ROS: 12 point system revived,please see HPI for pertinent positives. OBJECTIVE:     Visit Vitals  /71 (BP 1 Location: Left arm, BP Patient Position: Sitting)   Pulse 69   Temp 97.6 °F (36.4 °C) (Oral)   Resp 16   Ht 5' 9\" (1.753 m)   Wt 224 lb (101.6 kg)   SpO2 97%   BMI 33.08 kg/m²       Vision meet standards and hearing test good. Visual Acuity Screening    Right eye Left eye Both eyes   Without correction:      With correction: 20/25 20/25 20/25   Hearing Screening Comments: Hearing L/R &gt;5 feet       Physical Examination:    General appearance - alert, well appearing, and in no distress.     HEENT  PERRLA, EOMI, Sclera clear, anicteric, Oropharynx clear, no lesions. Chest - RRR S1, S2 heard, no peripheral edema. Lungs- Clear to auscultation, no wheezes, rales or rhonchi, has symmetric air entry    Neck- Supple, No adenopathy. Neuro- CN 2 to 11 grossly normal, No neuro deficits              DTR 2/4, strength on upper/lower ext 5/5 emily    Abdomen/groin- ND,NT, No hernia    ROM: Good ROM of upper and lower extremities. Feet normal      Psy- Mood and Affect WNL      ASSESSMENT/PLAN       ICD-10-CM ICD-9-CM    1. Encounter for occupational physical examination Z02.89 V70.5 AMB POC URINALYSIS DIP STICK AUTO W/O MICRO     Orders Placed This Encounter    AMB POC URINALYSIS DIP STICK AUTO W/O MICRO         Healthy appearing person. Advised routine care with PCP. 2 yr DOT card with no restrictions given. DOT papers kept in file and [facility] web site updated. I have discussed the diagnosis with the patient and the intended plan as seen in the above orders. The patient has received an after-visit summary and questions were answered concerning future plans. I have discussed medication side effects and warnings with the patient as well. Pt verbalizes understanding.     Follow-up and Dispositions    · Return in about 4 months (around 5/22/2020) for medicare annual.

## 2020-01-22 NOTE — LETTER
1/22/2020 12:20 PM 
 
Mr. Cristiana Butler [address] [facility] [address] Dear Cristiana Butler: 
 
Please find your most recent results below. Resulted Orders METABOLIC PANEL, BASIC (Collected: 1/14/2020 10:18 AM) Result Value Ref Range Glucose 90 65 - 99 mg/dL BUN 15 8 - 27 mg/dL Creatinine 0.97 0.76 - 1.27 mg/dL GFR est non-AA 80 >59 mL/min/1.73 GFR est AA 93 >59 mL/min/1.73  
 BUN/Creatinine ratio 15 10 - 24 Sodium 141 134 - 144 mmol/L Potassium 4.9 3.5 - 5.2 mmol/L Chloride 101 96 - 106 mmol/L  
 CO2 23 20 - 29 mmol/L Calcium 9.8 8.6 - 10.2 mg/dL Narrative Performed at:  [address]  [id number] : Roselyn Martinez MD, Phone:  [phone] LIPID PANEL (Collected: 1/14/2020 10:18 AM) Result Value Ref Range Cholesterol, total 253 (H) 100 - 199 mg/dL Triglyceride 105 0 - 149 mg/dL HDL Cholesterol 67 >39 mg/dL VLDL, calculated 21 5 - 40 mg/dL LDL, calculated 165 (H) 0 - 99 mg/dL Narrative Performed at:  [address]  [id number] : Roselyn Martinez MD, Phone:  [phone] CVD REPORT (Collected: 1/14/2020 10:18 AM) Result Value Ref Range INTERPRETATION Note Comment:  
   Supplemental report is available. Narrative Performed at:  [address] [place] [address]  [id number] : Mary Carmen Reyes MD, Phone:  [phone] RECOMMENDATIONS: 
Work on diet and exercise. Please call me if you have any questions: [phone] Sincerely, Rupal Gonzalez MD

## 2020-01-22 NOTE — PROGRESS NOTES
normal TOKEN:'[id number]' TOKEN:'[id number]',TOKEN:'[id number]:'[id number]'

## 2020-01-22 NOTE — PROGRESS NOTES
Chief Complaint   Patient presents with    Physical     DOT     I have reviewed the patient's medical history in detail and updated the computerized patient record. Health Maintenance reviewed. 1. Have you been to the ER, urgent care clinic since your last visit? Hospitalized since your last visit?no    2. Have you seen or consulted any other health care providers outside of the [address] [place] [address] [place] since your last visit? Include any pap smears or colon screening. No    Encouraged pt to discuss pt's wishes with spouse/partner/family and bring them in the next appt to follow thru with the Advanced Directive    Fall Risk Assessment, last 12 mths 1/22/2020   Able to walk? Yes   Fall in past 12 months? No       3 most recent PHQ Screens 1/22/2020   Little interest or pleasure in doing things Several days   Feeling down, depressed, irritable, or hopeless Several days   Total Score PHQ 2 2       Abuse Screening Questionnaire 2/20/2019   Do you ever feel afraid of your partner? N   Are you in a relationship with someone who physically or mentally threatens you? N   Is it safe for you to go home?  Y       ADL Assessment 2/20/2019   Feeding yourself No Help Needed   Getting from bed to chair No Help Needed   Getting dressed No Help Needed   Bathing or showering No Help Needed   Walk across the room (includes cane/walker) No Help Needed   Using the telphone No Help Needed   Taking your medications No Help Needed   Preparing meals No Help Needed   Managing money (expenses/bills) No Help Needed   Moderately strenuous housework (laundry) No Help Needed   Shopping for personal items (toiletries/medicines) No Help Needed   Shopping for groceries No Help Needed   Driving No Help Needed   Climbing a flight of stairs No Help Needed   Getting to places beyond walking distances No Help Needed

## 2020-01-22 NOTE — PATIENT INSTRUCTIONS
Medicare Wellness Visit, Male The best way to live healthy is to have a lifestyle where you eat a well-balanced diet, exercise regularly, limit alcohol use, and quit all forms of tobacco/nicotine, if applicable. Regular preventive services are another way to keep healthy. Preventive services (vaccines, screening tests, monitoring & exams) can help personalize your care plan, which helps you manage your own care. Screening tests can find health problems at the earliest stages, when they are easiest to treat. [facility] follows the current, evidence-based guidelines published by the [facility] Suresh Hazel ([facility]) when recommending preventive services for our patients. Because we follow these guidelines, sometimes recommendations change over time as research supports it. (For example, a prostate screening blood test is no longer routinely recommended for men with no symptoms). Of course, you and your doctor may decide to screen more often for some diseases, based on your risk and co-morbidities (chronic disease you are already diagnosed with). Preventive services for you include: - Medicare offers their members a free annual wellness visit, which is time for you and your primary care provider to discuss and plan for your preventive service needs. Take advantage of this benefit every year! 
-All adults over age 72 should receive the recommended pneumonia vaccines. Current USPSTF guidelines recommend a series of two vaccines for the best pneumonia protection.  
-All adults should have a flu vaccine yearly and tetanus vaccine every 10 years. 
-All adults age 48 and older should receive the shingles vaccines (series of two vaccines).       
-All adults age 38-68 who are overweight should have a diabetes screening test once every three years.  
-Other screening tests & preventive services for persons with diabetes include: an eye exam to screen for diabetic retinopathy, a kidney function test, a foot exam, and stricter control over your cholesterol.  
-Cardiovascular screening for adults with routine risk involves an electrocardiogram (ECG) at intervals determined by the provider.  
-Colorectal cancer screening should be done for adults age 54-65 with no increased risk factors for colorectal cancer. There are a number of acceptable methods of screening for this type of cancer. Each test has its own benefits and drawbacks. Discuss with your provider what is most appropriate for you during your annual wellness visit. The different tests include: colonoscopy (considered the best screening method), a fecal occult blood test, a fecal DNA test, and sigmoidoscopy. 
-All adults born between [facility] should be screened once for Hepatitis C. 
-An Abdominal Aortic Aneurysm (AAA) Screening is recommended for men age 73-68 who has ever smoked in their lifetime. Here is a list of your current Health Maintenance items (your personalized list of preventive services) with a due date: 
Health Maintenance Due Topic Date Due  Glaucoma Screening   03/19/2017 Grisel Gray Annual Well Visit  02/21/2020

## 2021-12-03 ENCOUNTER — TELEPHONE (OUTPATIENT)
Dept: INTERNAL MEDICINE CLINIC | Age: 71
End: 2021-12-03

## 2021-12-03 DIAGNOSIS — Z13.1 SCREENING FOR DIABETES MELLITUS: ICD-10-CM

## 2021-12-03 DIAGNOSIS — E78.2 MIXED HYPERLIPIDEMIA: Primary | ICD-10-CM

## 2021-12-03 NOTE — TELEPHONE ENCOUNTER
Patient came and setup appt for DOT/CDL Physical on 12/27. He would like lab orders to be written up before then so can discuss at appt. Thankyou!   [phone]

## 2021-12-21 LAB
BUN SERPL-MCNC: 11 MG/DL (ref 8–27)
BUN/CREAT SERPL: 12 (ref 10–24)
CALCIUM SERPL-MCNC: 10.2 MG/DL (ref 8.6–10.2)
CHLORIDE SERPL-SCNC: 102 MMOL/L (ref 96–106)
CHOLEST SERPL-MCNC: 289 MG/DL (ref 100–199)
CO2 SERPL-SCNC: 24 MMOL/L (ref 20–29)
CREAT SERPL-MCNC: 0.93 MG/DL (ref 0.76–1.27)
GLUCOSE SERPL-MCNC: 96 MG/DL (ref 65–99)
HDLC SERPL-MCNC: 65 MG/DL
IMP & REVIEW OF LAB RESULTS: NORMAL
LDLC SERPL CALC-MCNC: 204 MG/DL (ref 0–99)
POTASSIUM SERPL-SCNC: 4.6 MMOL/L (ref 3.5–5.2)
SODIUM SERPL-SCNC: 141 MMOL/L (ref 134–144)
TRIGL SERPL-MCNC: 112 MG/DL (ref 0–149)
VLDLC SERPL CALC-MCNC: 20 MG/DL (ref 5–40)

## 2021-12-21 NOTE — TELEPHONE ENCOUNTER
Please follow low cholesterol diet, enclosed. Your LDL cholesterol \"bad cholesterol\"  is higher than we like. Elevated cholesterol increases your risk of strokes and heart attacks, Please follow a low cholesterol diet to decrease your levels. If they do not improve, we may consider putting you on medicines, like atorvastatin. Increasing your level of exercise would also help. This would increase the HDL cholesterol or \"good cholesterol\".   We should recheck your cholesterol levels in 6 months

## 2021-12-27 ENCOUNTER — OFFICE VISIT (OUTPATIENT)
Dept: INTERNAL MEDICINE CLINIC | Age: 71
End: 2021-12-27
Payer: MEDICARE

## 2021-12-27 VITALS
RESPIRATION RATE: 16 BRPM | DIASTOLIC BLOOD PRESSURE: 75 MMHG | BODY MASS INDEX: 35.25 KG/M2 | HEART RATE: 75 BPM | WEIGHT: 238 LBS | OXYGEN SATURATION: 95 % | HEIGHT: 69 IN | TEMPERATURE: 98.4 F | SYSTOLIC BLOOD PRESSURE: 130 MMHG

## 2021-12-27 DIAGNOSIS — Z00.00 MEDICARE ANNUAL WELLNESS VISIT, SUBSEQUENT: Primary | ICD-10-CM

## 2021-12-27 DIAGNOSIS — Z23 ENCOUNTER FOR IMMUNIZATION: ICD-10-CM

## 2021-12-27 DIAGNOSIS — E78.2 MIXED HYPERLIPIDEMIA: ICD-10-CM

## 2021-12-27 DIAGNOSIS — Z13.31 SCREENING FOR DEPRESSION: ICD-10-CM

## 2021-12-27 DIAGNOSIS — Z13.6 SCREENING FOR ISCHEMIC HEART DISEASE: ICD-10-CM

## 2021-12-27 DIAGNOSIS — Z02.89 ENCOUNTER FOR OCCUPATIONAL PHYSICAL EXAMINATION: ICD-10-CM

## 2021-12-27 DIAGNOSIS — N52.9 ERECTILE DYSFUNCTION, UNSPECIFIED ERECTILE DYSFUNCTION TYPE: ICD-10-CM

## 2021-12-27 LAB
BILIRUB UR QL STRIP: NEGATIVE
GLUCOSE UR-MCNC: NEGATIVE MG/DL
KETONES P FAST UR STRIP-MCNC: NEGATIVE MG/DL
PH UR STRIP: 6 [PH] (ref 4.6–8)
PROT UR QL STRIP: NEGATIVE
SP GR UR STRIP: 1.02 (ref 1–1.03)
UA UROBILINOGEN AMB POC: NORMAL (ref 0.2–1)
URINALYSIS CLARITY POC: CLEAR
URINALYSIS COLOR POC: NORMAL
URINE BLOOD POC: NORMAL
URINE LEUKOCYTES POC: NEGATIVE
URINE NITRITES POC: NEGATIVE

## 2021-12-27 PROCEDURE — G0439 PPPS, SUBSEQ VISIT: HCPCS | Performed by: FAMILY MEDICINE

## 2021-12-27 PROCEDURE — 81003 URINALYSIS AUTO W/O SCOPE: CPT | Performed by: FAMILY MEDICINE

## 2021-12-27 PROCEDURE — 99214 OFFICE O/P EST MOD 30 MIN: CPT | Performed by: FAMILY MEDICINE

## 2021-12-27 PROCEDURE — 90694 VACC AIIV4 NO PRSRV 0.5ML IM: CPT | Performed by: FAMILY MEDICINE

## 2021-12-27 RX ORDER — SILDENAFIL 100 MG/1
100 TABLET, FILM COATED ORAL
Qty: 10 TABLET | Refills: 5 | Status: SHIPPED
Start: 2021-12-27 | End: 2022-10-28

## 2021-12-27 NOTE — PATIENT INSTRUCTIONS
Medicare Wellness Visit, Male    The best way to live healthy is to have a lifestyle where you eat a well-balanced diet, exercise regularly, limit alcohol use, and quit all forms of tobacco/nicotine, if applicable. Regular preventive services are another way to keep healthy. Preventive services (vaccines, screening tests, monitoring & exams) can help personalize your care plan, which helps you manage your own care. Screening tests can find health problems at the earliest stages, when they are easiest to treat. [facility] follows the current, evidence-based guidelines published by the [facility] Suresh Hazel ([facility]) when recommending preventive services for our patients. Because we follow these guidelines, sometimes recommendations change over time as research supports it. (For example, a prostate screening blood test is no longer routinely recommended for men with no symptoms). Of course, you and your doctor may decide to screen more often for some diseases, based on your risk and co-morbidities (chronic disease you are already diagnosed with). Preventive services for you include:  - Medicare offers their members a free annual wellness visit, which is time for you and your primary care provider to discuss and plan for your preventive service needs. Take advantage of this benefit every year!  -All adults over age 72 should receive the recommended pneumonia vaccines. Current USPSTF guidelines recommend a series of two vaccines for the best pneumonia protection.   -All adults should have a flu vaccine yearly and tetanus vaccine every 10 years.  -All adults age 48 and older should receive the shingles vaccines (series of two vaccines).        -All adults age 38-68 who are overweight should have a diabetes screening test once every three years.   -Other screening tests & preventive services for persons with diabetes include: an eye exam to screen for diabetic retinopathy, a kidney function test, a foot exam, and stricter control over your cholesterol.   -Cardiovascular screening for adults with routine risk involves an electrocardiogram (ECG) at intervals determined by the provider.   -Colorectal cancer screening should be done for adults age 54-65 with no increased risk factors for colorectal cancer. There are a number of acceptable methods of screening for this type of cancer. Each test has its own benefits and drawbacks. Discuss with your provider what is most appropriate for you during your annual wellness visit. The different tests include: colonoscopy (considered the best screening method), a fecal occult blood test, a fecal DNA test, and sigmoidoscopy.  -All adults born between [facility] should be screened once for Hepatitis C.  -An Abdominal Aortic Aneurysm (AAA) Screening is recommended for men age 73-68 who has ever smoked in their lifetime. Here is a list of your current Health Maintenance items (your personalized list of preventive services) with a due date:  Health Maintenance Due   Topic Date Due    COVID-19 Vaccine (1) Never done    Shingles Vaccine (1 of 2) Never done    Yearly Flu Vaccine (1) 09/01/2021        Learning About Low-Fat Eating  What is low-fat eating? Most food has some fat in it. Your body needs some fat to be healthy. But some kinds of fats are healthier than others. In a low-fat eating plan, you try to choose healthier fats and eat fewer unhealthy fats. Healthy fats include olive and canola oil. Try to avoid eating too much saturated fat, such as in cheese and meats. You do not need to cut all fat from your diet. But you can make healthier choices about the types and amount of fat you eat. Even though it is a good idea to choose healthier fats, it is still important to be careful of how much fat you eat, because all fats are high in calories. What are the different types of fats? Unhealthy fat  · Saturated fat. Saturated fats are mostly in animal foods, such as meat and dairy foods. Tropical oils, such as coconut oil, palm oil, and cocoa butter, are also saturated fats. Healthy fats  · Monounsaturated fat. Monounsaturated fats are liquid at room temperature but get solid when refrigerated. Eating foods that are high in this fat may help lower your \"bad\" (LDL) cholesterol, keep your \"good\" (HDL) cholesterol level up, and lower your chances of getting coronary artery disease. This fat is found in canola oil, olive oil, peanut oil, olives, avocados, nuts, and nut butters. · Polyunsaturated fat. Polyunsaturated fats are liquid at room temperature. They are in safflower, sunflower, and corn oils. They are also the main fat in seafood. Omega-3 fatty acids are types of polyunsaturated fat. Eating fish may lower your chances of getting coronary artery disease. Fatty fish such as salmon and mackerel contain these healthy fatty acids. So do ground flaxseeds and flaxseed oil, soybeans, walnuts, and seeds. Why cut down on unhealthy fats? Eating foods that contain saturated fats can raise the LDL (\"bad\") cholesterol in your blood. Having a high level of LDL cholesterol increases your chance of hardening of the arteries (atherosclerosis), which can lead to heart disease, heart attack, and stroke. In general:  · No more than 10% of your daily calories should come from saturated fat. This is about 20 grams in a 2,000-calorie diet. · No more than 10% of your daily calories should come from polyunsaturated fat. This is about 20 grams in a 2,000-calorie diet. · Monounsaturated fats can be up to 15% of your daily calories. This is about 25 to 30 grams in a 2,000-calorie diet. If you're not sure how much fat you should be eating or how many calories you need each day to stay at a healthy weight, talk to a registered dietitian. A dietitian can help you create a plan that's right for you. What can you do to cut down on fat?   Foods like cheese, butter, sausage, and desserts can have a lot of unhealthy fats. Try these tips for healthier meals at home and when you eat out. At home  · Fill up on fruits, vegetables, and whole grains. · Think of meat as a side dish instead of as the main part of your meal.  · When you do eat meat, make it extra-lean ground beef (97% lean), ground turkey breast (without skin added), meats with fat trimmed off before cooking, or skinless chicken. · Try main dishes that use whole wheat pasta, brown rice, dried beans, or vegetables. · Use cooking methods that use little or no fat, such as broiling, steaming, or grilling. Use cooking spray instead of oil. If you use oil, use a monounsaturated oil, such as canola or olive oil. · Read food labels on canned, bottled, or packaged foods. Choose those with little saturated fat. When eating out at a restaurant  · Order foods that are broiled or poached instead of fried or breaded. · Cut back on the amount of butter or margarine that you use on bread. Use small amounts of olive oil instead. · Order sauces, gravies, and salad dressings on the side, and use only a little. · When you order pasta, choose tomato sauce instead of cream sauce. · Ask for salsa with your baked potato instead of sour cream, butter, cheese, or jorge. Where can you learn more? Go to http://www.gray.com/  Enter [id number] in the search box to learn more about \"Learning About Low-Fat Eating. \"  Current as of: December 17, 2020               Content Version: 13.0  © 3802-7313 [employer]. Care instructions adapted under license by [employer] (which disclaims liability or warranty for this information). If you have questions about a medical condition or this instruction, always ask your healthcare professional. [place] [address] any warranty or liability for your use of this information.

## 2021-12-27 NOTE — PROGRESS NOTES
This is the Subsequent Medicare Annual Wellness Exam, performed 12 months or more after the Initial AWV or the last Subsequent AWV    I have reviewed the patient's medical history in detail and updated the computerized patient record. Needs CDL  He wants some sildenafil just in case. No complaints. Assessment/Plan   Education and counseling provided:  Are appropriate based on today's review and evaluation  Influenza Vaccine  Cardiovascular screening blood test  Diabetes screening test  Reviewed recent labs only significant finding is significantly elevated cholesterol LDL, discussed statin recommended starting but he is declining  Discussed possible side affects, precautions, and drug interactions and possible benefits of the medication(s). Okay sildenafil      ICD-10-CM ICD-9-CM    1. Medicare annual wellness visit, subsequent  Z00.00 V70.0 ADMIN INFLUENZA VIRUS VAC   2. Encounter for occupational physical examination  Z02.89 V70.5 AMB POC URINALYSIS DIP STICK AUTO W/O MICRO      ADMIN INFLUENZA VIRUS VAC   3. Screening for ischemic heart disease  Z13.6 V81.0 ADMIN INFLUENZA VIRUS VAC   4. Screening for depression  Z13.31 V79.0 DEPRESSION SCREEN ANNUAL      ADMIN INFLUENZA VIRUS VAC   5. Encounter for immunization  Z23 V03.89 FLU (FLUAD QUAD INFLUENZA VACCINE,QUAD,ADJUVANTED)      ADMIN INFLUENZA VIRUS VAC   6. Mixed hyperlipidemia  E78.2 272.2 ADMIN INFLUENZA VIRUS VAC   7.  Erectile dysfunction, unspecified erectile dysfunction type  N52.9 607.84 sildenafil citrate (VIAGRA) 100 mg tablet      ADMIN INFLUENZA VIRUS VAC         Depression Risk Factor Screening     3 most recent PHQ Screens 12/27/2021   Little interest or pleasure in doing things Several days   Feeling down, depressed, irritable, or hopeless Several days   Total Score PHQ 2 2       Alcohol & Drug Abuse Risk Screen    Do you average more than 1 drink per night or more than 7 drinks a week: Yes    In the past three months have you have had more than 4 drinks containing alcohol on one occasion: No          Functional Ability and Level of Safety    Hearing: Hearing is good. Activities of Daily Living: The home contains: no safety equipment. Patient does total self care      Ambulation: with no difficulty     Fall Risk:  Fall Risk Assessment, last 12 mths 2021   Able to walk? Yes   Fall in past 12 months? 0   Do you feel unsteady? 0   Are you worried about falling 0      Abuse Screen:  Patient is not abused       Cognitive Screening    Has your family/caregiver stated any concerns about your memory: no     Cognitive Screening: Abnormal - Clock Drawing Test    Health Maintenance Due     Health Maintenance Due   Topic Date Due    COVID-19 Vaccine (1) Never done    Shingrix Vaccine Age 50> (1 of 2) Never done    Flu Vaccine (1) 2021       Patient Care Team   Patient Care Team:  Julianna Singh MD as PCP - General (Family Medicine)  Julianna Singh MD as PCP - [facility] Empaneled Provider    History     Patient Active Problem List   Diagnosis Code    Hyperlipidemia E78.5    Severe obesity ([facility] [address] E66.01     Past Medical History:   Diagnosis Date    Colonic polyp       Past Surgical History:   Procedure Laterality Date    HX APPENDECTOMY      HX CHOLECYSTECTOMY      HX COLONOSCOPY       Current Outpatient Medications   Medication Sig Dispense Refill    AFLURIA QUAD 3990-1787, PF, syrg injection inject 0.5 milliliter intramuscularly  0     No Known Allergies    Family History   Problem Relation Age of Onset    Cancer Father      Social History     Tobacco Use    Smoking status: Former Smoker     Packs/day: 1.25     Types: Cigarettes     Start date: 1971     Quit date: 1979     Years since quittin.5    Smokeless tobacco: Never Used   Substance Use Topics    Alcohol use:  Yes     Alcohol/week: 28.0 standard drinks     Types: 14 Shots of liquor, 14 Standard drinks or equivalent per week         Gin Turcios MD

## 2021-12-27 NOTE — PROGRESS NOTES
DOT PHYSICAL    Duane S Coghill 73 y.o. presents for a DOT physical.  He has the following concerns: No complaints [place] no long-haul javier or anything like that  No dizziness, syncope or fainting, exertional chest pain, excessive shortness of breath, focal weakness, abdominal pain, severe depressed mood, thoughts of suicide, recreational drug abuse, excessive alcohol usage. No excessive sleepiness in the day time falling asleep at the wheel or any motor vehicle accidents. No diabetes. No insulin or narcotic agents. Patient Active Problem List   Diagnosis Code    Hyperlipidemia E78.5    Severe obesity ([facility] [address] E66.01     Past Medical History:   Diagnosis Date    Colonic polyp      Past Surgical History:   Procedure Laterality Date    HX APPENDECTOMY      HX CHOLECYSTECTOMY      HX COLONOSCOPY       Social History     Socioeconomic History    Marital status:      Spouse name: Not on file    Number of children: 2    Years of education: Not on file    Highest education level: Not on file   Occupational History    Occupation: [employer]   Tobacco Use    Smoking status: Former Smoker     Packs/day: 1.25     Types: Cigarettes     Start date: 1971     Quit date: 1979     Years since quittin.5    Smokeless tobacco: Never Used   Substance and Sexual Activity    Alcohol use:  Yes     Alcohol/week: 34.0 standard drinks     Types: 17 Shots of liquor, 17 Standard drinks or equivalent per week    Drug use: No    Sexual activity: Yes     Partners: Female   Other Topics Concern    Not on file   Social History Narrative    Lives with wife     Social Determinants of Health     Financial Resource Strain:     Difficulty of Paying Living Expenses: Not on file   Food Insecurity:     Worried About Running Out of Food in the Last Year: Not on file    Jamal of Food in the Last Year: Not on SWAPNA Mora Needs:     Lack of Transportation (Medical): Not on file    Lack of Transportation (Non-Medical): Not on file   Physical Activity:     Days of Exercise per Week: Not on file    Minutes of Exercise per Session: Not on file   Stress:     Feeling of Stress : Not on file   Social Connections:     Frequency of Communication with Friends and Family: Not on file    Frequency of Social Gatherings with Friends and Family: Not on file    Attends Mormonism Services: Not on file    Active Member of [address] [place] [address] or Organizations: Not on file    Attends Club or Organization Meetings: Not on file    Marital Status: Not on file   Intimate Partner Violence:     Fear of Current or Ex-Partner: Not on file    Emotionally Abused: Not on file    Physically Abused: Not on file    Sexually Abused: Not on file   Housing Stability:     Unable to Pay for Housing in the Last Year: Not on file    Number of [place] in the Last Year: Not on file    Unstable Housing in the Last Year: Not on file       Current Outpatient Medications   Medication Sig Dispense Refill    sildenafil citrate (VIAGRA) 100 mg tablet Take 1 Tablet by mouth daily as needed for Erectile Dysfunction.  10 Tablet 5    AFLURIA QUAD 5880-3758, PF, syrg injection inject 0.5 milliliter intramuscularly  0         Results for orders placed or performed in visit on 12/27/21   AMB POC URINALYSIS DIP STICK AUTO W/O MICRO   Result Value Ref Range    Color (UA POC) Urszula     Clarity (UA POC) Clear     Glucose (UA POC) Negative Negative    Bilirubin (UA POC) Negative Negative    Ketones (UA POC) Negative Negative    Specific gravity (UA POC) 1.025 1.001 - 1.035    Blood (UA POC) Trace Negative    pH (UA POC) 6.0 4.6 - 8.0    Protein (UA POC) Negative Negative    Urobilinogen (UA POC) 0.2 mg/dL 0.2 - 1    Nitrites (UA POC) Negative Negative    Leukocyte esterase (UA POC) Negative Negative         ROS: 12 point system revived,please see HPI for pertinent positives. OBJECTIVE:     Visit Vitals  /75 (BP 1 Location: Left arm, BP Patient Position: Sitting, BP Cuff Size: Adult)   Pulse 75   Temp 98.4 °F (36.9 °C) (Temporal)   Resp 16   Ht 5' 9\" (1.753 m)   Wt 238 lb (108 kg)   SpO2 95%   BMI 35.15 kg/m²       Vision meet standards and hearing test good. Hearing Screening    125Hz 250Hz 500Hz 1000Hz 2000Hz 3000Hz 4000Hz 6000Hz 8000Hz   Right ear:            Left ear:            Comments: Hearing L/R &gt;5 feet     Visual Acuity Screening    Right eye Left eye Both eyes   Without correction:      With correction: 20/25 20/25 20/25          Physical Examination:    General appearance - alert, well appearing, and in no distress. HEENT  PERRLA, EOMI, Sclera clear, anicteric, Oropharynx clear, no lesions. Chest - RRR S1, S2 heard, no peripheral edema. Lungs- Clear to auscultation, no wheezes, rales or rhonchi, has symmetric air entry    Neck- Supple, No adenopathy. Neuro- CN 2 to 11 grossly normal, No neuro deficits              DTR 2/4, strength on upper/lower ext 5/5 emily    Abdomen/groin- ND,NT, No hernia    ROM: Good ROM of upper and lower extremities. Feet normal      Psy- Mood and Affect WNL      ASSESSMENT/PLAN         Healthy appearing person. Advised routine care with PCP. 2 yr DOT card with no restrictions given. DOT papers kept in file and [facility] web site updated. I have discussed the diagnosis with the patient and the intended plan as seen in the above orders. The patient has received an after-visit summary and questions were answered concerning future plans. I have discussed medication side effects and warnings with the patient as well. Pt verbalizes understanding.

## 2021-12-27 NOTE — PROGRESS NOTES
Chief Complaint   Patient presents with    Annual Wellness Visit     DOT      Clock Draw Test Done    Patient has not been out of the country in (14 months), NO diarrhea, NO cough, NO chest conjestion, NO temp. Pt has not been around anyone with these symptoms. Health Maintenance reviewed. I have reviewed the patient's medical history in detail and updated the computerized patient record. 1. Have you been to the ER, urgent care clinic since your last visit? no  Hospitalized since your last visit?  no    2. Have you seen or consulted any other health care providers outside of the [address] [place] [address] since your last visit? No Include any pap smears or colon screening. Encouraged pt to discuss pt's wishes with spouse/partner/family and bring them in the next appt to follow thru with the Advanced Directive    @  [address], last 12 mths 12/27/2021   Able to walk? Yes   Fall in past 12 months? 0   Do you feel unsteady? 0   Are you worried about falling 0       3 most recent PHQ Screens 12/27/2021   Little interest or pleasure in doing things Several days   Feeling down, depressed, irritable, or hopeless Several days   Total Score PHQ 2 2       Abuse Screening Questionnaire 12/27/2021   Do you ever feel afraid of your partner? N   Are you in a relationship with someone who physically or mentally threatens you? N   Is it safe for you to go home?  Y       ADL Assessment 12/27/2021   Feeding yourself No Help Needed   Getting from bed to chair No Help Needed   Getting dressed No Help Needed   Bathing or showering No Help Needed   Walk across the room (includes cane/walker) No Help Needed   Using the telphone No Help Needed   Taking your medications No Help Needed   Preparing meals No Help Needed   Managing money (expenses/bills) No Help Needed   Moderately strenuous housework (laundry) No Help Needed   Shopping for personal items (toiletries/medicines) No Help Needed   Shopping for groceries No Help Needed   Driving No Help Needed   Climbing a flight of stairs No Help Needed   Getting to places beyond walking distances No Help Needed

## 2022-03-19 PROBLEM — E66.01 SEVERE OBESITY (HCC): Status: ACTIVE | Noted: 2019-02-20

## 2022-10-26 ENCOUNTER — OFFICE VISIT (OUTPATIENT)
Dept: INTERNAL MEDICINE CLINIC | Age: 72
End: 2022-10-26
Payer: MEDICARE

## 2022-10-26 VITALS
OXYGEN SATURATION: 96 % | HEART RATE: 85 BPM | SYSTOLIC BLOOD PRESSURE: 143 MMHG | RESPIRATION RATE: 16 BRPM | BODY MASS INDEX: 35.55 KG/M2 | HEIGHT: 69 IN | WEIGHT: 240 LBS | TEMPERATURE: 98.4 F | DIASTOLIC BLOOD PRESSURE: 73 MMHG

## 2022-10-26 DIAGNOSIS — N52.9 ERECTILE DYSFUNCTION, UNSPECIFIED ERECTILE DYSFUNCTION TYPE: Primary | ICD-10-CM

## 2022-10-26 PROCEDURE — G8432 DEP SCR NOT DOC, RNG: HCPCS | Performed by: FAMILY MEDICINE

## 2022-10-26 PROCEDURE — G8427 DOCREV CUR MEDS BY ELIG CLIN: HCPCS | Performed by: FAMILY MEDICINE

## 2022-10-26 PROCEDURE — G8417 CALC BMI ABV UP PARAM F/U: HCPCS | Performed by: FAMILY MEDICINE

## 2022-10-26 PROCEDURE — 3017F COLORECTAL CA SCREEN DOC REV: CPT | Performed by: FAMILY MEDICINE

## 2022-10-26 PROCEDURE — 1101F PT FALLS ASSESS-DOCD LE1/YR: CPT | Performed by: FAMILY MEDICINE

## 2022-10-26 PROCEDURE — 99213 OFFICE O/P EST LOW 20 MIN: CPT | Performed by: FAMILY MEDICINE

## 2022-10-26 PROCEDURE — G8536 NO DOC ELDER MAL SCRN: HCPCS | Performed by: FAMILY MEDICINE

## 2022-10-26 RX ORDER — VARDENAFIL HYDROCHLORIDE 20 MG/1
20 TABLET ORAL
Qty: 10 TABLET | Refills: 2 | Status: SHIPPED | OUTPATIENT
Start: 2022-10-26

## 2022-10-26 NOTE — PROGRESS NOTES
Chief Complaint   Patient presents with    Personal Problem     Patient has not been out of the country in (14 months), NO diarrhea, NO cough, NO chest conjestion, NO temp. Pt has not been around anyone with these symptoms. Health Maintenance reviewed. I have reviewed the patient's medical history in detail and updated the computerized patient record. 1. Have you been to the ER, urgent care clinic since your last visit? No  Hospitalized since your last visit?  no    2. Have you seen or consulted any other health care providers outside of the [address] [place] [address] since your last visit? no  Include any pap smears or colon screening. Encouraged pt to discuss pt's wishes with spouse/partner/family and bring them in the next appt to follow thru with the Advanced Directive    @  [address], last 12 mths 12/27/2021   Able to walk? Yes   Fall in past 12 months? 0   Do you feel unsteady? 0   Are you worried about falling 0       3 most recent PHQ Screens 12/27/2021   Little interest or pleasure in doing things Several days   Feeling down, depressed, irritable, or hopeless Several days   Total Score PHQ 2 2       Abuse Screening Questionnaire 12/27/2021   Do you ever feel afraid of your partner? N   Are you in a relationship with someone who physically or mentally threatens you? N   Is it safe for you to go home?  Y       ADL Assessment 12/27/2021   Feeding yourself No Help Needed   Getting from bed to chair No Help Needed   Getting dressed No Help Needed   Bathing or showering No Help Needed   Walk across the room (includes cane/walker) No Help Needed   Using the telphone No Help Needed   Taking your medications No Help Needed   Preparing meals No Help Needed   Managing money (expenses/bills) No Help Needed   Moderately strenuous housework (laundry) No Help Needed   Shopping for personal items (toiletries/medicines) No Help Needed   Shopping for groceries No Help Needed   Driving No Help Needed Climbing a flight of stairs No Help Needed   Getting to places beyond walking distances No Help Needed

## 2022-10-28 NOTE — PROGRESS NOTES
Duane S Coghill (: 1950) is a 81 y.o. male, established patient, here for evaluation of the following chief complaint(s):  Personal Problem       ASSESSMENT/PLAN:  Below is the assessment and plan developed based on review of pertinent history, physical exam, labs, studies, and medications. 1. Erectile dysfunction, unspecified erectile dysfunction type  -     vardenafiL (Levitra) 20 mg tablet; Take 20 mg by mouth daily as needed for Erectile Dysfunction. , Normal, Disp-10 Tablet, R-2    Discussed possible side affects, precautions, and drug interactions and possible benefits of the medication(s). Trial of above    Return in about 6 weeks (around 2022). SUBJECTIVE/OBJECTIVE:  HPI  Chief complaint is difficulty with erections. He is a  and has a new partner. Tried sildenafil but relates having heartburn symptoms with it. Review of Systems  No chest pain when he walks, breathing okay    Patient Active Problem List   Diagnosis Code    Hyperlipidemia E78.5    Severe obesity ([facility] [address] E66.01     Social History     Socioeconomic History    Marital status:      Spouse name: Not on file    Number of children: 2    Years of education: Not on file    Highest education level: Not on file   Occupational History    Occupation: farming   Tobacco Use    Smoking status: Former     Packs/day: 1.25     Types: Cigarettes     Start date: 1971     Quit date: 1979     Years since quittin.3    Smokeless tobacco: Never   Substance and Sexual Activity    Alcohol use:  Yes     Alcohol/week: 34.0 standard drinks     Types: 17 Shots of liquor, 17 Standard drinks or equivalent per week    Drug use: No    Sexual activity: Yes     Partners: Female   Other Topics Concern    Not on file   Social History Narrative    Lives with wife, she passed     Social Determinants of Health     Financial Resource Strain: Not on file   Food Insecurity: Not on file   Transportation Needs: Not on file   Physical Activity: Not on file   Stress: Not on file   Social Connections: Not on file   Intimate Partner Violence: Not on file   Housing Stability: Not on file       Physical Exam    Visit Vitals  BP (!) 143/73   Pulse 85   Temp 98.4 °F (36.9 °C) (Temporal)   Resp 16   Ht 5' 9\" (1.753 m)   Wt 240 lb (108.9 kg)   SpO2 96%   BMI 35.44 kg/m²         An electronic signature was used to authenticate this note.   -- Devyn Sharma MD

## 2023-01-30 ENCOUNTER — TELEPHONE (OUTPATIENT)
Dept: INTERNAL MEDICINE CLINIC | Age: 73
End: 2023-01-30

## 2023-01-30 DIAGNOSIS — E78.2 MIXED HYPERLIPIDEMIA: ICD-10-CM

## 2023-01-30 NOTE — TELEPHONE ENCOUNTER
Pt would like to have yearly labs ordered for physical on 2/13/2023.  Pt can be reached when this is ready for pickup at [phone]

## 2023-02-01 LAB
ALBUMIN SERPL-MCNC: 4.4 G/DL (ref 3.8–4.8)
ALBUMIN/GLOB SERPL: 1.6 {RATIO} (ref 1.2–2.2)
ALP SERPL-CCNC: 91 IU/L (ref 44–121)
ALT SERPL-CCNC: 26 IU/L (ref 0–44)
AST SERPL-CCNC: 17 IU/L (ref 0–40)
BILIRUB SERPL-MCNC: 0.3 MG/DL (ref 0–1.2)
BUN SERPL-MCNC: 13 MG/DL (ref 8–27)
BUN/CREAT SERPL: 14 (ref 10–24)
CALCIUM SERPL-MCNC: 9.5 MG/DL (ref 8.6–10.2)
CHLORIDE SERPL-SCNC: 106 MMOL/L (ref 96–106)
CHOLEST SERPL-MCNC: 253 MG/DL (ref 100–199)
CO2 SERPL-SCNC: 22 MMOL/L (ref 20–29)
CREAT SERPL-MCNC: 0.95 MG/DL (ref 0.76–1.27)
EGFRCR SERPLBLD CKD-EPI 2021: 86 ML/MIN/1.73
GLOBULIN SER CALC-MCNC: 2.8 G/DL (ref 1.5–4.5)
GLUCOSE SERPL-MCNC: 98 MG/DL (ref 70–99)
HDLC SERPL-MCNC: 67 MG/DL
IMP & REVIEW OF LAB RESULTS: NORMAL
LDLC SERPL CALC-MCNC: 169 MG/DL (ref 0–99)
POTASSIUM SERPL-SCNC: 4.8 MMOL/L (ref 3.5–5.2)
PROT SERPL-MCNC: 7.2 G/DL (ref 6–8.5)
SODIUM SERPL-SCNC: 142 MMOL/L (ref 134–144)
TRIGL SERPL-MCNC: 99 MG/DL (ref 0–149)
VLDLC SERPL CALC-MCNC: 17 MG/DL (ref 5–40)

## 2023-02-12 NOTE — PATIENT INSTRUCTIONS
Medicare Wellness Visit, Male    The best way to live healthy is to have a lifestyle where you eat a well-balanced diet, exercise regularly, limit alcohol use, and quit all forms of tobacco/nicotine, if applicable. Regular preventive services are another way to keep healthy. Preventive services (vaccines, screening tests, monitoring & exams) can help personalize your care plan, which helps you manage your own care. Screening tests can find health problems at the earliest stages, when they are easiest to treat. [facility] follows the current, evidence-based guidelines published by the [facility] Suresh Hazel ([facility]) when recommending preventive services for our patients. Because we follow these guidelines, sometimes recommendations change over time as research supports it. (For example, a prostate screening blood test is no longer routinely recommended for men with no symptoms). Of course, you and your doctor may decide to screen more often for some diseases, based on your risk and co-morbidities (chronic disease you are already diagnosed with). Preventive services for you include:  - Medicare offers their members a free annual wellness visit, which is time for you and your primary care provider to discuss and plan for your preventive service needs.  Take advantage of this benefit every year!    -Over the age of 72 should receive the recommended pneumonia vaccines.   -All adults should have a flu vaccine yearly.  -All adults should receive a tetanus vaccine every 10 years.  -Over the age of 48 should receive the shingles vaccines.    -All adults should be screened once for Hepatitis C.  -All adults age 38-68 who are overweight should have a diabetes screening test once every three years.   -Other screening tests & preventive services for persons with diabetes include: an eye exam to screen for diabetic retinopathy, a kidney function test, a foot exam, and stricter control over your cholesterol.   -Cardiovascular screening for adults with routine risk involves an electrocardiogram (ECG) at intervals determined by the provider.     -Colorectal cancer screening should be done for adults age 43-69 with no increased risk factors for colorectal cancer. There are a number of acceptable methods of screening for this type of cancer. Each test has its own benefits and drawbacks. Discuss with your provider what is most appropriate for you during your annual wellness visit. The different tests include: colonoscopy (considered the best screening method), a fecal occult blood test, a fecal DNA test, and sigmoidoscopy.    -Lung cancer screening is recommended annually with a low dose CT scan for adults between age 54 and 68, who have smoked at least 30 pack years (equivalent of 1 pack per day for 30 days), and who is a current smoker or quit less than 15 years ago. -An Abdominal Aortic Aneurysm (AAA) Screening is recommended for men age 73-68 who has ever smoked in their lifetime. Here is a list of your current Health Maintenance items (your personalized list of preventive services) with a due date:  Health Maintenance Due   Topic Date Due    COVID-19 Vaccine (4 - Booster for [employer] series) 01/28/2022    Depresssion Screening  12/27/2022       Medicare Wellness Visit, Male    The best way to live healthy is to have a lifestyle where you eat a well-balanced diet, exercise regularly, limit alcohol use, and quit all forms of tobacco/nicotine, if applicable. Regular preventive services are another way to keep healthy. Preventive services (vaccines, screening tests, monitoring & exams) can help personalize your care plan, which helps you manage your own care. Screening tests can find health problems at the earliest stages, when they are easiest to treat.    [facility] follows the current, evidence-based guidelines published by the [facility] ([facility]) when recommending preventive services for our patients. Because we follow these guidelines, sometimes recommendations change over time as research supports it. (For example, a prostate screening blood test is no longer routinely recommended for men with no symptoms). Of course, you and your doctor may decide to screen more often for some diseases, based on your risk and co-morbidities (chronic disease you are already diagnosed with). Preventive services for you include:  - Medicare offers their members a free annual wellness visit, which is time for you and your primary care provider to discuss and plan for your preventive service needs. Take advantage of this benefit every year!    -Over the age of 72 should receive the recommended pneumonia vaccines.   -All adults should have a flu vaccine yearly.  -All adults should receive a tetanus vaccine every 10 years.  -Over the age of 48 should receive the shingles vaccines.    -All adults should be screened once for Hepatitis C.  -All adults age 38-68 who are overweight should have a diabetes screening test once every three years.   -Other screening tests & preventive services for persons with diabetes include: an eye exam to screen for diabetic retinopathy, a kidney function test, a foot exam, and stricter control over your cholesterol.   -Cardiovascular screening for adults with routine risk involves an electrocardiogram (ECG) at intervals determined by the provider.     -Colorectal cancer screening should be done for adults age 43-69 with no increased risk factors for colorectal cancer. There are a number of acceptable methods of screening for this type of cancer. Each test has its own benefits and drawbacks. Discuss with your provider what is most appropriate for you during your annual wellness visit.  The different tests include: colonoscopy (considered the best screening method), a fecal occult blood test, a fecal DNA test, and sigmoidoscopy.    -Lung cancer screening is recommended annually with a low dose CT scan for adults between age 54 and 68, who have smoked at least 30 pack years (equivalent of 1 pack per day for 30 days), and who is a current smoker or quit less than 15 years ago. -An Abdominal Aortic Aneurysm (AAA) Screening is recommended for men age 73-68 who has ever smoked in their lifetime.      Here is a list of your current Health Maintenance items (your personalized list of preventive services) with a due date:  Health Maintenance Due   Topic Date Due    COVID-19 Vaccine (4 - Booster for Alonna Melody series) 01/28/2022    Depresssion Screening  12/27/2022

## 2023-02-13 ENCOUNTER — OFFICE VISIT (OUTPATIENT)
Dept: INTERNAL MEDICINE CLINIC | Age: 73
End: 2023-02-13
Payer: MEDICARE

## 2023-02-13 VITALS
DIASTOLIC BLOOD PRESSURE: 82 MMHG | TEMPERATURE: 98.2 F | WEIGHT: 245 LBS | RESPIRATION RATE: 16 BRPM | HEIGHT: 69 IN | BODY MASS INDEX: 36.29 KG/M2 | OXYGEN SATURATION: 96 % | HEART RATE: 78 BPM | SYSTOLIC BLOOD PRESSURE: 138 MMHG

## 2023-02-13 DIAGNOSIS — N52.9 ERECTILE DYSFUNCTION, UNSPECIFIED ERECTILE DYSFUNCTION TYPE: ICD-10-CM

## 2023-02-13 DIAGNOSIS — Z13.6 SCREENING FOR ISCHEMIC HEART DISEASE: ICD-10-CM

## 2023-02-13 DIAGNOSIS — M17.0 OSTEOARTHRITIS OF BOTH KNEES, UNSPECIFIED OSTEOARTHRITIS TYPE: ICD-10-CM

## 2023-02-13 DIAGNOSIS — E66.01 SEVERE OBESITY (BMI 35.0-39.9) WITH COMORBIDITY (HCC): ICD-10-CM

## 2023-02-13 DIAGNOSIS — R05.3 CHRONIC COUGH: ICD-10-CM

## 2023-02-13 DIAGNOSIS — Z00.00 MEDICARE ANNUAL WELLNESS VISIT, SUBSEQUENT: Primary | ICD-10-CM

## 2023-02-13 DIAGNOSIS — Z13.39 SCREENING FOR ALCOHOLISM: ICD-10-CM

## 2023-02-13 DIAGNOSIS — Z23 ENCOUNTER FOR IMMUNIZATION: ICD-10-CM

## 2023-02-13 PROCEDURE — 1101F PT FALLS ASSESS-DOCD LE1/YR: CPT | Performed by: FAMILY MEDICINE

## 2023-02-13 PROCEDURE — 3017F COLORECTAL CA SCREEN DOC REV: CPT | Performed by: FAMILY MEDICINE

## 2023-02-13 PROCEDURE — 99214 OFFICE O/P EST MOD 30 MIN: CPT | Performed by: FAMILY MEDICINE

## 2023-02-13 PROCEDURE — G8417 CALC BMI ABV UP PARAM F/U: HCPCS | Performed by: FAMILY MEDICINE

## 2023-02-13 PROCEDURE — G8510 SCR DEP NEG, NO PLAN REQD: HCPCS | Performed by: FAMILY MEDICINE

## 2023-02-13 PROCEDURE — G8427 DOCREV CUR MEDS BY ELIG CLIN: HCPCS | Performed by: FAMILY MEDICINE

## 2023-02-13 PROCEDURE — G0439 PPPS, SUBSEQ VISIT: HCPCS | Performed by: FAMILY MEDICINE

## 2023-02-13 PROCEDURE — G8536 NO DOC ELDER MAL SCRN: HCPCS | Performed by: FAMILY MEDICINE

## 2023-02-13 RX ORDER — AZITHROMYCIN 250 MG/1
250 TABLET, FILM COATED ORAL SEE ADMIN INSTRUCTIONS
Qty: 6 TABLET | Refills: 0 | Status: SHIPPED | OUTPATIENT
Start: 2023-02-13

## 2023-02-13 RX ORDER — VARDENAFIL HYDROCHLORIDE 20 MG/1
20 TABLET ORAL
Qty: 10 TABLET | Refills: 5 | Status: SHIPPED | OUTPATIENT
Start: 2023-02-13

## 2023-02-13 NOTE — PROGRESS NOTES
HISTORY OF PRESENT ILLNESS  Duane Seretha Forester is a 81 y.o. male. Cough  The history is provided by the Patient. This is a new problem. Episode onset: 2 mo. The problem occurs daily. The problem has not changed since onset. Pertinent negatives include no chest pain and no shortness of breath. Treatments tried: amox. Went to [facility] with bronchitis in December, had a cough since then  Levitra tends to give him heartburn but does work well for his ED. Review of Systems   Constitutional:  Negative for fever and weight loss. Respiratory:  Positive for cough. Negative for hemoptysis and shortness of breath. Cardiovascular:  Negative for chest pain. Gastrointestinal:  Positive for heartburn. Only with levitra     Patient Active Problem List   Diagnosis Code    Hyperlipidemia E78.5    Severe obesity ([facility] [address] E66.01     Social History     Socioeconomic History    Marital status:      Spouse name: Not on file    Number of children: 2    Years of education: Not on file    Highest education level: Not on file   Occupational History    Occupation: farming   Tobacco Use    Smoking status: Former     Packs/day: 1.25     Types: Cigarettes     Start date: 1971     Quit date: 1979     Years since quittin.6    Smokeless tobacco: Never   Substance and Sexual Activity    Alcohol use: Yes     Alcohol/week: 56.0 standard drinks     Types: 28 Shots of liquor, 28 Standard drinks or equivalent per week    Drug use: No    Sexual activity: Yes     Partners: Female   Other Topics Concern    Not on file   Social History Narrative    Lives with wife, she passed.      Social Determinants of Health     Financial Resource Strain: Not on file   Food Insecurity: Not on file   Transportation Needs: Not on file   Physical Activity: Not on file   Stress: Not on file   Social Connections: Not on file   Intimate Partner Violence: Not on file   Housing Stability: Not on file         Physical Exam  Visit Vitals  /82 (BP 1 Location: Left arm, BP Patient Position: Sitting, BP Cuff Size: Adult)   Pulse 78   Temp 98.2 °F (36.8 °C) (Temporal)   Resp 16   Ht 5' 9\" (1.753 m)   Wt 245 lb (111.1 kg)   SpO2 96%   BMI 36.18 kg/m²     WD WN male NAD  Heart RRR without murmers clicks or rubs  Lungs CTA  Abdo soft nontender  Ext no edema    ASSESSMENT and PLAN  Encounter Diagnoses   Name Primary? Medicare annual wellness visit, subsequent Yes    Screening for ischemic heart disease     Screening for alcoholism     Chronic cough     Severe obesity (BMI 35.0-39. 9) with comorbidity ([facility] [address]     Osteoarthritis of both knees, unspecified osteoarthritis type     Encounter for immunization     Erectile dysfunction, unspecified erectile dysfunction type      Orders Placed This Encounter    XR CHEST PA LAT    azithromycin (ZITHROMAX) 250 mg tablet    varicella-zoster recombinant, PF, (SHINGRIX) 50 mcg/0.5 mL susr injection    vardenafiL (Levitra) 20 mg tablet     Consider GERD versus pertussis. Check chest x-ray see if the Z-Jasiel helps him. If this cough continues he will need to see me otherwise we will see him in 6 months    This is the Subsequent Medicare Annual Wellness Exam, performed 12 months or more after the Initial AWV or the last Subsequent AWV    I have reviewed the patient's medical history in detail and updated the computerized patient record. Assessment/Plan   Education and counseling provided:  Pneumococcal Vaccine  Influenza Vaccine  Cardiovascular screening blood test  Diabetes screening test  We discussed his cholesterol which is mildly elevated but improved. He is declining statin medicines. Also declines any vaccinations. No diabetes  1. Medicare annual wellness visit, subsequent  2. Screening for ischemic heart disease  3. Screening for alcoholism  -     [facility] ANNUAL ALCOHOL SCREEN 15 MIN  4. Chronic cough  -     XR CHEST PA LAT; Future  -     azithromycin (ZITHROMAX) 250 mg tablet;  Take 1 Tablet by mouth See Admin Instructions. Take two tablets today then one tablet daily for next 4 days, Normal, Disp-6 Tablet, R-0  5. Severe obesity (BMI 35.0-39. 9) with comorbidity ([address]  6. Osteoarthritis of both knees, unspecified osteoarthritis type  7. Encounter for immunization  -     varicella-zoster recombinant, PF, (SHINGRIX) 50 mcg/0.5 mL susr injection; 0.5 mL by IntraMUSCular route once for 1 dose., Print, Disp-0.5 mL, R-0  8. Erectile dysfunction, unspecified erectile dysfunction type  -     vardenafiL (Levitra) 20 mg tablet; Take 20 mg by mouth daily as needed for Erectile Dysfunction. , Normal, Disp-10 Tablet, R-5       Depression Risk Factor Screening     3 most recent PHQ Screens 2/13/2023   Little interest or pleasure in doing things Several days   Feeling down, depressed, irritable, or hopeless Several days   Total Score PHQ 2 2       Alcohol & Drug Abuse Risk Screen    Do you average more than 1 drink per night or more than 7 drinks a week: Yes    In the past three months have you have had more than 4 drinks containing alcohol on one occasion: No          Functional Ability and Level of Safety    Hearing: Hearing is good. Activities of Daily Living: The home contains: no safety equipment. Patient does total self care      Ambulation: With some pain in his knees but he still works in the farm. Fall Risk:  Fall Risk Assessment, last 12 mths 2/13/2023   Able to walk? Yes   Fall in past 12 months? 0   Do you feel unsteady?  0   Are you worried about falling 0      Abuse Screen:  Patient is not abused       Cognitive Screening    Has your family/caregiver stated any concerns about your memory: no     Cognitive Screening: Normal - Clock Drawing Test    Health Maintenance Due     Health Maintenance Due   Topic Date Due    COVID-19 Vaccine (4 - Booster for Ady Scales series) 01/28/2022    Depression Screen  12/27/2022       Patient Care Team   Patient Care Team:  Paige Ward MD as PCP - General (Family Medicine)  Susan Hernandez Aneta Castro MD as PCP - [facility]  Ginny vieyra  [facility]    History     Patient Active Problem List   Diagnosis Code    Hyperlipidemia E78.5    Severe obesity ([address] E66.01     Past Medical History:   Diagnosis Date    Colonic polyp       Past Surgical History:   Procedure Laterality Date    HX APPENDECTOMY      HX CHOLECYSTECTOMY      HX COLONOSCOPY       Current Outpatient Medications   Medication Sig Dispense Refill    azithromycin (ZITHROMAX) 250 mg tablet Take 1 Tablet by mouth See Admin Instructions. Take two tablets today then one tablet daily for next 4 days 6 Tablet 0    varicella-zoster recombinant, PF, (SHINGRIX) 50 mcg/0.5 mL susr injection 0.5 mL by IntraMUSCular route once for 1 dose. 0.5 mL 0    vardenafiL (Levitra) 20 mg tablet Take 20 mg by mouth daily as needed for Erectile Dysfunction. 10 Tablet 5    AFLURIA QUAD 2340-8241, PF, syrg injection   0     No Known Allergies    Family History   Problem Relation Age of Onset    Cancer Father      Social History     Tobacco Use    Smoking status: Former     Packs/day: 1.25     Types: Cigarettes     Start date: 1971     Quit date: 1979     Years since quittin.6    Smokeless tobacco: Never   Substance Use Topics    Alcohol use:  Yes     Alcohol/week: 56.0 standard drinks     Types: 28 Shots of liquor, 28 Standard drinks or equivalent per week         Surendra Barakat MD

## 2023-02-13 NOTE — PROGRESS NOTES
Chief Complaint   Patient presents with    Annual Wellness Visit     Clock Draw Test Done     Patient has not been out of the country in (14 months), NO diarrhea, NO cough, NO chest conjestion, NO temp. Pt has not been around anyone with these symptoms. Health Maintenance reviewed. I have reviewed the patient's medical history in detail and updated the computerized patient record. 1. \"Have you been to the ER, urgent care clinic since your last visit? No Hospitalized since your last visit? \" no    2. \"Have you seen or consulted any other health care providers outside of the [address] [place] [address] since your last visit? \"  no    3. For patients aged 39-70: Has the patient had a colonoscopy / FIT/ Cologuard? no    Encouraged pt to discuss pt's wishes with spouse/partner/family and bring them in the next appt to follow thru with the Advanced Directive    @  Fall Risk Assessment, last 12 mths 2/13/2023   Able to walk? Yes   Fall in past 12 months? 0   Do you feel unsteady? 0   Are you worried about falling 0       3 most recent PHQ Screens 2/13/2023   Little interest or pleasure in doing things Several days   Feeling down, depressed, irritable, or hopeless Several days   Total Score PHQ 2 2       Abuse Screening Questionnaire 2/13/2023   Do you ever feel afraid of your partner? N   Are you in a relationship with someone who physically or mentally threatens you? N   Is it safe for you to go home?  Y       ADL Assessment 2/13/2023   Feeding yourself No Help Needed   Getting from bed to chair No Help Needed   Getting dressed No Help Needed   Bathing or showering No Help Needed   Walk across the room (includes cane/walker) No Help Needed   Using the telphone No Help Needed   Taking your medications No Help Needed   Preparing meals No Help Needed   Managing money (expenses/bills) No Help Needed   Moderately strenuous housework (laundry) No Help Needed   Shopping for personal items (toiletries/medicines) No Help Needed   Shopping for groceries No Help Needed   Driving No Help Needed   Climbing a flight of stairs No Help Needed   Getting to places beyond walking distances No Help Needed

## 2023-08-22 ENCOUNTER — TELEPHONE (OUTPATIENT)
Facility: CLINIC | Age: 73
End: 2023-08-22

## 2023-12-01 ENCOUNTER — TELEPHONE (OUTPATIENT)
Facility: CLINIC | Age: 73
End: 2023-12-01

## 2023-12-01 NOTE — TELEPHONE ENCOUNTER
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 He would like to have some labs written up before then to get done to discuss at appt. Please call patient when done!   [employer]    [phone]

## 2023-12-06 DIAGNOSIS — E66.01 SEVERE OBESITY (HCC): Primary | ICD-10-CM

## 2023-12-07 NOTE — TELEPHONE ENCOUNTER
Referred by: SILVERIO Sexton; Medical Diagnosis (from order):    Diagnosis Information      Diagnosis    V15.88 (ICD-9-CM) - Z91.81 (ICD-10-CM) - At risk for falls            Therapy Benefits     Payor: Medicaid - UHC  Authorization Needed: No  Maximum Visit Limit Per Year: Based on medical necessity  CoPay: Do not collect  Notes: All treatment provided by a PTA/MAJOR must have PT/OT co-signature  Call Ref #: Online     *Evaluation requires MD, NP, PA, or DO co-signature*  *Per the 2020 [facility] edit tables, 59843 ther activities and 16161 group therapy are not payable when they are billed on the same date as a PT or OT evaluation.  This new edit does not apply to a PT/OT reevaluation charge or speech evaluations.*    PMH: OA bilateral knees, Severe spinal canal stenosis at T11-T12 with associated compression of the spinal cord and focal myelomalacia per previous MR imaging, and previous Spine Orthopedic recommendation of surgical decompression at T11-12 to prevent further spinal cord function deterioration, a C6-7 ACDF, and lumbar surgical decompression at L3-4 and L4-5. Fall Risk         Initial Eval 6/2/20 8.11.20   Outcome Measure/Test         Falls Efficacy Scale 51 49 56   TUG 27 sec 31.3 sec 18.6 sec   10 m Walk test 0.49 m/sec 0.45 m/sec 0.68 m/sec   5x sit to stand (UE use)   33.2 sec 25.7 sec   6 min walk test   375 ft (4ww) 500 ft             Physical Therapy -  Daily Treatment Note    Visit:  13   Treatment Diagnosis: lumbar, symptoms with increased pain/symptoms, impaired mobility, impaired balance, impaired strength    SUBJECTIVE                                                                                                             Unfortunately burned his scalp and torso while making rice 3 wks ago.  He was evaluated by a MD.  This is the reason he could not attend PT.  Doing relatively well now, although has been less active of late.  He did fall to the floor when he was scalded - no injury  Sent letter to patient with lab orders. reported. Requesting electric stimulation today.    Still watching JUSTIN, although disappointed Spencer are out.                   Pain / Symptoms:  Pain rating (out of 10): Current: 6   Patient Goals: decreased pain, improved balance and increased strength      OBJECTIVE                                                                                                                          TREATMENT                                                                                                                  Therapeutic Exercise:  NuStfilemon, level 5      Leg press              Bilateral: 20 lb x 15x3       Single rail at terrain park: occasional use of therapist assist or 2 hands on single rail required                   Combined Cuco training + small Island/stone stepping                          Height: 4-9 inches                          Cuco number: 4                          Island number: 5                          Fwd x 2 reps                          Lateral x 2 reps                                        Occasional seated rest breaks required                      Therapeutic Activity:    Epic documentation error:   Unattended Estim  IFC  Lower Back  Sitting   Hz  No adverse reaction      Skilled input: verbal instruction/cues and as detailed above    Home Exercise Program: Access Code: [email or web address]            URL: [email or web address]       Date: 02/25/2020   Prepared by: Benito Ruiz      Exercises  Seated March - 10 reps - 3 sets - 1x daily - 7x weekly  Seated Long Arc Quad - 10 reps - 3 sets - 1x daily - 7x weekly  Seated Ankle Pumps - 10 reps - 3 sets - 1x daily - 7x weekly  Seated Transversus Abdominis Bracing - 10 reps - 3 sets - 10 hold - 1x daily - 7x weekly  Supine Bridge - 10 reps - 3 sets - 5 hold - 1x daily - 7x weekly  Seated Shoulder W - 10 reps - 3 sets - 1x daily - 7x weekly  Standing March with Counter Support - 10 reps - 3 sets - 1x daily - 7x weekly  Standing Hip Abduction  with Counter Support - 10 reps - 3 sets - 1x daily - 7x weekly  Un-attended Electrical Stimulation (89642/): Interferential Current  Location: Lower back  Position: sitting  Pulse Rate:  Hz  Intensity: patient comfort  Epic documentation error:   Unattended Estim  IFC  Lower Back  Sitting   Hz  No adverse reaction  Duration: 15 minutes  Reaction: no adverse reaction to treatment        ASSESSMENT                                                                                                           60 year old male patient has reported functional limitations listed above impacted by signs and symptoms consistent with below   • Involved: lumbar   • Symptoms/impairments: increased pain/symptoms, impaired mobility, impaired balance and impaired strength  Symptoms consistent with Musculoskeletal Lower Back pain, Lumbar Myelopathy, bilateral foot drop.  He has a previous diagnosis of Severe spinal canal stenosis at T11-T12 with associated compression of the spinal cord and focal myelomalacia per previous MR imaging, and previous Spine Orthopedic recommendation of surgical decompression at T11-12 to prevent further spinal cord function deterioration, a C6-7 ACDF, and lumbar surgical decompression at L3-4 and L4-5.  Bilateral foot drop - with bilateral AFOs.  He is a high fall risk.  Scalding accident likely a limiting factor in terms functional progress.        Prognosis: patient will benefit from skilled therapy  Rehabilitative: goodTo date the patient has made gains as expectedas reported. Patient continues to have impairments and functional deficits as noted.  Patient will continue to benefit from skilled care as outlined.  Patient Education:   Results of above outlined education: Demonstrates understanding and Verbalizes understanding      PLAN                                                                                                                           Updates to plan of care: modify  plan of care    Patient involved in and agreed to plan of care and goals.  Suggestions for next session as indicated: Progress per plan of care  Reassess status   Guard against falls    Electrical stimulation as requested       GOALS                                                                                                                       Long Term Goals: To be met by end of plan of care:      Home Exercise Program: Independent with progressed and modified home exercise program (HEP)      Status:  Progressing/ongoing    Bend/Squat: Bend/squat for activities of daily living and instrumental activites of daily living with reported manageable/tolerable difficulty Demonstrate safe lifting mechanics. (Household tasks (domestic life))    Status:  Progressing/ongoing    Patient Reported Outcome Measure: Improvement in function /disability/impairment as indicated by Falls Efficacy Scale < or =   25     Status:  Progressing/ongoing    Report a 50% decrease in symptom level. (Community life)    Status:  Progressing/ongoing      Procedures and total treatment time documented Time Entry flowsheet.

## 2023-12-14 LAB
BUN SERPL-MCNC: 14 MG/DL (ref 8–27)
BUN/CREAT SERPL: 14 (ref 10–24)
CALCIUM SERPL-MCNC: 10.1 MG/DL (ref 8.6–10.2)
CHLORIDE SERPL-SCNC: 103 MMOL/L (ref 96–106)
CO2 SERPL-SCNC: 24 MMOL/L (ref 20–29)
CREAT SERPL-MCNC: 0.99 MG/DL (ref 0.76–1.27)
EGFRCR SERPLBLD CKD-EPI 2021: 81 ML/MIN/1.73
GLUCOSE SERPL-MCNC: 120 MG/DL (ref 70–99)
POTASSIUM SERPL-SCNC: 4.5 MMOL/L (ref 3.5–5.2)
SODIUM SERPL-SCNC: 140 MMOL/L (ref 134–144)

## 2023-12-19 ENCOUNTER — TELEPHONE (OUTPATIENT)
Facility: CLINIC | Age: 73
End: 2023-12-19

## 2023-12-19 NOTE — TELEPHONE ENCOUNTER
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 because he tested positive for Covid yesterday - not symptomatic at all - just feels like a slight cold - checked because he was at a  recently and some of the guests had been sick - offered a VV but patient does not know how to do that and was not interested in that - only has slight congestion, no cough, no fever - no real symptoms - advised that since he tested positive for Covid he will need to quarantine himself for 5 days - we have rescheduled his CDL physical to 2023 and I have encouraged patient to call us again if he develops any worsening symptoms - patient agreed  Natacha Samayoa LPN 2023 9:12 AM

## 2023-12-27 NOTE — PROGRESS NOTES
DOT PHYSICAL    Duane S Coghill 72 y.o. presents for a DOT physical.  He has the following concerns: had lab glu sl elevated not fasting  No c/o    No dizziness, syncope or fainting, exertional chest pain, excessive shortness of breath, focal weakness, abdominal pain, severe depressed mood, thoughts of suicide, recreational drug abuse, excessive alcohol usage. No excessive sleepiness in the day time falling asleep at the wheel or any motor vehicle accidents. No diabetes. No insulin or narcotic agents. Patient Active Problem List   Diagnosis    Hyperlipidemia    Other male erectile dysfunction    BMI 36.0-36.9,adult       Past Medical History:   Diagnosis Date    Colonic polyp      Past Surgical History:   Procedure Laterality Date    APPENDECTOMY      CHOLECYSTECTOMY      COLONOSCOPY       Social History     Socioeconomic History    Marital status:      Spouse name: Not on file    Number of children: Not on file    Years of education: Not on file    Highest education level: Not on file   Occupational History    Not on file   Tobacco Use    Smoking status: Former     Current packs/day: 0.00     Average packs/day: 1.3 packs/day for 8.4 years (10.5 ttl pk-yrs)     Types: Cigarettes     Start date: 1971     Quit date: 1979     Years since quittin.5    Smokeless tobacco: Never   Substance and Sexual Activity    Alcohol use: Yes     Alcohol/week: 56.0 standard drinks of alcohol    Drug use: No    Sexual activity: Not on file   Other Topics Concern    Not on file   Social History Narrative    Lives with wife, she passed.      Social Determinants of Health     Financial Resource Strain: Not on file   Food Insecurity: Not on file   Transportation Needs: Not on file   Physical Activity: Not on file   Stress: Not on file   Social Connections: Not on file   Intimate Partner Violence: Not on file   Housing Stability: Not on file       No meds        No results found for this visit on

## 2023-12-28 ENCOUNTER — OFFICE VISIT (OUTPATIENT)
Facility: CLINIC | Age: 73
End: 2023-12-28

## 2023-12-28 VITALS
DIASTOLIC BLOOD PRESSURE: 85 MMHG | RESPIRATION RATE: 16 BRPM | HEART RATE: 72 BPM | TEMPERATURE: 98 F | WEIGHT: 250 LBS | BODY MASS INDEX: 37.03 KG/M2 | OXYGEN SATURATION: 94 % | HEIGHT: 69 IN | SYSTOLIC BLOOD PRESSURE: 129 MMHG

## 2023-12-28 DIAGNOSIS — E78.2 MIXED HYPERLIPIDEMIA: ICD-10-CM

## 2023-12-28 DIAGNOSIS — Z02.89 ENCOUNTER FOR PHYSICAL EXAMINATION RELATED TO EMPLOYMENT: Primary | ICD-10-CM

## 2023-12-28 PROBLEM — E66.01 SEVERE OBESITY (HCC): Status: RESOLVED | Noted: 2019-02-20 | Resolved: 2023-12-28

## 2023-12-28 PROBLEM — N52.8 OTHER MALE ERECTILE DYSFUNCTION: Status: ACTIVE | Noted: 2023-07-02

## 2023-12-28 PROBLEM — N52.8 OTHER MALE ERECTILE DYSFUNCTION: Status: RESOLVED | Noted: 2023-07-02 | Resolved: 2023-12-28

## 2023-12-28 LAB
BILIRUBIN, URINE, POC: NEGATIVE
BLOOD URINE, POC: NEGATIVE
GLUCOSE URINE, POC: NEGATIVE
KETONES, URINE, POC: NEGATIVE
LEUKOCYTE ESTERASE, URINE, POC: NORMAL
NITRITE, URINE, POC: NEGATIVE
PH, URINE, POC: 5.5 (ref 4.6–8)
PROTEIN,URINE, POC: NEGATIVE
SPECIFIC GRAVITY, URINE, POC: 1.02 (ref 1–1.03)
URINALYSIS CLARITY, POC: CLEAR
URINALYSIS COLOR, POC: YELLOW
UROBILINOGEN, POC: NORMAL

## 2024-03-12 LAB
CHOLEST SERPL-MCNC: 277 MG/DL (ref 100–199)
HDLC SERPL-MCNC: 71 MG/DL
IMP & REVIEW OF LAB RESULTS: NORMAL
LDLC SERPL CALC-MCNC: 178 MG/DL (ref 0–99)
TRIGL SERPL-MCNC: 153 MG/DL (ref 0–149)
VLDLC SERPL CALC-MCNC: 28 MG/DL (ref 5–40)

## 2024-03-14 ENCOUNTER — OFFICE VISIT (OUTPATIENT)
Facility: CLINIC | Age: 74
End: 2024-03-14

## 2024-03-14 VITALS
DIASTOLIC BLOOD PRESSURE: 75 MMHG | RESPIRATION RATE: 16 BRPM | WEIGHT: 245 LBS | SYSTOLIC BLOOD PRESSURE: 134 MMHG | TEMPERATURE: 97.8 F | HEART RATE: 71 BPM | HEIGHT: 69 IN | OXYGEN SATURATION: 94 % | BODY MASS INDEX: 36.29 KG/M2

## 2024-03-14 DIAGNOSIS — E66.01 SEVERE OBESITY (BMI 35.0-39.9) WITH COMORBIDITY (HCC): ICD-10-CM

## 2024-03-14 DIAGNOSIS — Z00.00 MEDICARE ANNUAL WELLNESS VISIT, SUBSEQUENT: Primary | ICD-10-CM

## 2024-03-14 DIAGNOSIS — R22.32 NODULE OF SKIN OF LEFT FOREARM: ICD-10-CM

## 2024-03-14 DIAGNOSIS — E78.2 MIXED HYPERLIPIDEMIA: ICD-10-CM

## 2024-03-14 SDOH — ECONOMIC STABILITY: FOOD INSECURITY: WITHIN THE PAST 12 MONTHS, THE FOOD YOU BOUGHT JUST DIDN'T LAST AND YOU DIDN'T HAVE MONEY TO GET MORE.: NEVER TRUE

## 2024-03-14 SDOH — ECONOMIC STABILITY: HOUSING INSECURITY
IN THE LAST 12 MONTHS, WAS THERE A TIME WHEN YOU DID NOT HAVE A STEADY PLACE TO SLEEP OR SLEPT IN A SHELTER (INCLUDING NOW)?: NO

## 2024-03-14 SDOH — ECONOMIC STABILITY: INCOME INSECURITY: HOW HARD IS IT FOR YOU TO PAY FOR THE VERY BASICS LIKE FOOD, HOUSING, MEDICAL CARE, AND HEATING?: NOT HARD AT ALL

## 2024-03-14 SDOH — ECONOMIC STABILITY: FOOD INSECURITY: WITHIN THE PAST 12 MONTHS, YOU WORRIED THAT YOUR FOOD WOULD RUN OUT BEFORE YOU GOT MONEY TO BUY MORE.: NEVER TRUE

## 2024-03-14 ASSESSMENT — ANXIETY QUESTIONNAIRES
2. NOT BEING ABLE TO STOP OR CONTROL WORRYING: 0
7. FEELING AFRAID AS IF SOMETHING AWFUL MIGHT HAPPEN: 0
IF YOU CHECKED OFF ANY PROBLEMS ON THIS QUESTIONNAIRE, HOW DIFFICULT HAVE THESE PROBLEMS MADE IT FOR YOU TO DO YOUR WORK, TAKE CARE OF THINGS AT HOME, OR GET ALONG WITH OTHER PEOPLE: NOT DIFFICULT AT ALL
1. FEELING NERVOUS, ANXIOUS, OR ON EDGE: 0

## 2024-03-14 NOTE — PROGRESS NOTES
Chief Complaint   Patient presents with    Medicare AWV     Patient has not been out of the country in (14 months), NO diarrhea, NO cough, NO chest conjestion, NO temp.  Pt has not been around anyone with these symptoms.     Health Maintenance reviewed.    I have reviewed the patient's medical history in detail and updated the computerized patient record.    \"Have you been to the ER, urgent care clinic since your last visit?  Hospitalized since your last visit?\"    no    “Have you seen or consulted any other health care providers outside of [facility] since your last visit?”    no                                         
m (5' 9\")   Wt 111.1 kg (245 lb)   SpO2 94%   BMI 36.18 kg/m²     2 cm nodule on arm    Patient Labs were reviewed and or requested  Patient Past Records were reviewed and or requested      Assessment/ Plan:      Diagnosis Orders   1. Medicare annual wellness visit, subsequent        2. Mixed hyperlipidemia        3. BMI 36.0-36.9,adult        4. Severe obesity (BMI 35.0-39.9) with comorbidity (HCC)            -A comprehensive review of medical issues and a plan was formulated  -End of life planning was addressed with pt .no  -Health Screenings for preventions were addressed and a plan was formulated  -Needed Vaccine was recommended  -Discussed with patient cancer risk factors and appropriate screenings for age  -Patient evaluated for colonoscopy and referred if needed per screeing criteria  -Labs from previous visits were discussed with patient   -Discussed with patient diet and exercise and formulated a plan as needed    Can return for biopsy      I have discussed the diagnosis with the patient and the intended plan as seen in the above orders. The patient understands and agrees with the plan. The patient has received an after-visit summary and questions were answered concerning future plans.           Dick Tyson MD FAAFP

## 2024-08-01 ENCOUNTER — HOSPITAL ENCOUNTER (OUTPATIENT)
Facility: HOSPITAL | Age: 74
Setting detail: SPECIMEN
Discharge: HOME | End: 2024-08-04

## 2024-08-01 ENCOUNTER — OFFICE VISIT (OUTPATIENT)
Facility: CLINIC | Age: 74
End: 2024-08-01

## 2024-08-01 VITALS
TEMPERATURE: 98.1 F | HEART RATE: 94 BPM | OXYGEN SATURATION: 93 % | SYSTOLIC BLOOD PRESSURE: 139 MMHG | WEIGHT: 247 LBS | RESPIRATION RATE: 16 BRPM | DIASTOLIC BLOOD PRESSURE: 76 MMHG | BODY MASS INDEX: 36.58 KG/M2 | HEIGHT: 69 IN

## 2024-08-01 DIAGNOSIS — C44.609 SKIN CANCER OF ARM, LEFT: Primary | ICD-10-CM

## 2024-08-01 ASSESSMENT — PATIENT HEALTH QUESTIONNAIRE - PHQ9
SUM OF ALL RESPONSES TO PHQ QUESTIONS 1-9: 2
SUM OF ALL RESPONSES TO PHQ QUESTIONS 1-9: 2
2. FEELING DOWN, DEPRESSED OR HOPELESS: SEVERAL DAYS
SUM OF ALL RESPONSES TO PHQ QUESTIONS 1-9: 2
1. LITTLE INTEREST OR PLEASURE IN DOING THINGS: SEVERAL DAYS
SUM OF ALL RESPONSES TO PHQ9 QUESTIONS 1 & 2: 2
SUM OF ALL RESPONSES TO PHQ QUESTIONS 1-9: 2

## 2024-08-01 NOTE — PROGRESS NOTES
Duane S Coghill (:  1950) is a 74 y.o. male, Established patient, here for evaluation of the following chief complaint(s): As below  Mass (Soft mass to lower L/forearm)         Assessment & Plan  1. Left arm nodule.  The left arm nodule has been present for over a year and has shown significant growth. A surgical excision of the nodule was performed today. He was instructed to keep the dressing on for about 24 hours and then clean the area with soap and water. He should avoid soaking the area during showers and apply a large Band-Aid after showering. The sutures will be removed in a follow-up visit scheduled for 7 to 10 days from now. The excised tissue will be sent for pathology, and results will be discussed during the follow-up visit. If the pathology indicates a need for a wider excision, further surgical intervention may be required.    Options discussed. Discussed possible side affects and benefits of the procedure and/or medications. The patient agrees to undergo the procedure. Consent obtained. Sterile procedure followed. There were no complications and the procedure was well tolerated. Instructed patient to call me if it is ineffective or if there are any complications like increasing pain, redness or swelling.        Follow-up  The patient will follow up in 7 to 10 days.    PROCEDURE  The patient has a left arm nodule that is 2 cm long by 1.5 cm.  Using elliptical incision I cut it out and sutured it back up again.  Procedure was well-tolerated    Results    1. Skin cancer of arm, left  -     Biopsy of Skin Lesion  -     Surgical Pathology; Future          Diagnosis Orders   1. Skin cancer of arm, left  Biopsy of Skin Lesion    Surgical Pathology        Orders Placed This Encounter    Biopsy of Skin Lesion    Surgical Pathology     Standing Status:   Future     Standing Expiration Date:   2025     Order Specific Question:   PREVIOUS BIOPSY     Answer:   No     Order Specific Question:

## 2024-08-01 NOTE — PROGRESS NOTES
Chief Complaint   Patient presents with    Mass     Soft mass to lower L/forearm     Patient has not been out of the country in (14 months), NO diarrhea, NO cough, NO chest conjestion, NO temp.  Pt has not been around anyone with these symptoms.     Health Maintenance reviewed.    I have reviewed the patient's medical history in detail and updated the computerized patient record.    \"Have you been to the ER, urgent care clinic since your last visit? No  Hospitalized since your last visit?\"    no    “Have you seen or consulted any other health care providers outside of [facility] since your last visit?”    no     [unfilled]  OFFICE PROCEDURE PROGRESS NOTE        Chart reviewed for the following:   ILona LPN, have reviewed the History, Physical and updated the Allergic reactions for Duane S Coghill     TIME OUT performed immediately prior to start of procedure:   Dick DIETRICH MD have performed the following reviews on Duane S Coghill prior to the start of the procedure:            * Patient was identified by name and date of birth   * Agreement on procedure being performed was verified  * Risks and Benefits explained to the patient by Dick Tyson MD  * Procedure site verified and marked as necessary  * Patient was positioned for comfort  * Consent was signed and verified     Time: 3:00 PM      Date of procedure:  0-    Procedure performed by:  Dick Tyson MD    Provider assisted by: Lona Johnson LPN    Patient assisted by:  Lona Johnson LPN    How tolerated by patient: Well    Post Procedural Pain Scale: 0/10    Comments: None    Dr. Tyson did the procedure

## 2024-08-09 ENCOUNTER — OFFICE VISIT (OUTPATIENT)
Facility: CLINIC | Age: 74
End: 2024-08-09

## 2024-08-09 VITALS
DIASTOLIC BLOOD PRESSURE: 81 MMHG | SYSTOLIC BLOOD PRESSURE: 130 MMHG | OXYGEN SATURATION: 94 % | WEIGHT: 255 LBS | HEART RATE: 77 BPM | TEMPERATURE: 97.1 F | HEIGHT: 69 IN | BODY MASS INDEX: 37.77 KG/M2 | RESPIRATION RATE: 16 BRPM

## 2024-08-09 DIAGNOSIS — E78.2 MIXED HYPERLIPIDEMIA: Primary | ICD-10-CM

## 2024-08-09 NOTE — PROGRESS NOTES
Duane S Coghill (:  1950) is a 74 y.o. male, Established patient, here for evaluation of the following chief complaint(s): as below  Suture / Staple Removal (Left arm)         Assessment & Plan  1. Post-biopsy follow-up.  The pathology report confirmed the presence of basal cell cancer, which has been successfully excised. The sutures will be removed by the nursing staff during this visit.        Results  Testing  Pathology of the biopsy was basal cell cancer, fully removed.  1. Mixed hyperlipidemia          Diagnosis Orders   1. Mixed hyperlipidemia          No orders of the defined types were placed in this encounter.    No current outpatient medications on file.     No current facility-administered medications for this visit.     Return in about 7 months (around 3/9/2025) for Medicare wellness visit annual.      Subjective   History of Present Illness  The patient is a 74-year-old male who presents for suture removal.    He is here today for the removal of his sutures. He does not take any medications.    Review of Systems       Objective     /81 (Site: Left Upper Arm, Position: Sitting, Cuff Size: Large Adult)   Pulse 77   Temp 97.1 °F (36.2 °C) (Temporal)   Resp 16   Ht 1.753 m (5' 9\")   Wt 115.7 kg (255 lb)   SpO2 94%   BMI 37.66 kg/m²     Physical Exam         See chart for problem list family and social history.    The patient (or guardian, if applicable) and other individuals in attendance with the patient were advised that Artificial Intelligence will be utilized during this visit to record and process the conversation to generate a clinical note. The patient (or guardian, if applicable) and other individuals in attendance at the appointment consented to the use of AI, including the recording.      An electronic signature was used to authenticate this note.    --Dick Tyson MD

## 2024-10-17 PROBLEM — M17.12 ARTHRITIS OF LEFT KNEE: Status: ACTIVE | Noted: 2024-10-17

## 2024-12-12 NOTE — PERIOP NOTE
PATIENT SAID HE WANTS TO HAVE PREOPS DONE IN [place] WHERE HE LIVES.  I SUGGESTED HE CALL DR. ENGLAND'S OFFICE AND MAKE ARRANGEMENTS.  I'LL CHECK WITH HIM NEXT WEEK TO GET UPDATE

## 2024-12-22 PROBLEM — Z85.828 HISTORY OF BASAL CELL CANCER: Status: ACTIVE | Noted: 2024-12-22

## 2024-12-30 ENCOUNTER — OFFICE VISIT (OUTPATIENT)
Facility: CLINIC | Age: 74
End: 2024-12-30
Payer: MEDICARE

## 2024-12-30 VITALS
HEART RATE: 85 BPM | HEIGHT: 69 IN | TEMPERATURE: 97.7 F | SYSTOLIC BLOOD PRESSURE: 145 MMHG | OXYGEN SATURATION: 93 % | WEIGHT: 256.4 LBS | RESPIRATION RATE: 17 BRPM | DIASTOLIC BLOOD PRESSURE: 82 MMHG | BODY MASS INDEX: 37.98 KG/M2

## 2024-12-30 DIAGNOSIS — Z85.828 HISTORY OF BASAL CELL CANCER: ICD-10-CM

## 2024-12-30 DIAGNOSIS — E78.2 MIXED HYPERLIPIDEMIA: ICD-10-CM

## 2024-12-30 DIAGNOSIS — Z01.818 PRE-OP EVALUATION: Primary | ICD-10-CM

## 2024-12-30 PROCEDURE — G8417 CALC BMI ABV UP PARAM F/U: HCPCS | Performed by: FAMILY MEDICINE

## 2024-12-30 PROCEDURE — G8427 DOCREV CUR MEDS BY ELIG CLIN: HCPCS | Performed by: FAMILY MEDICINE

## 2024-12-30 PROCEDURE — 3017F COLORECTAL CA SCREEN DOC REV: CPT | Performed by: FAMILY MEDICINE

## 2024-12-30 PROCEDURE — G8484 FLU IMMUNIZE NO ADMIN: HCPCS | Performed by: FAMILY MEDICINE

## 2024-12-30 PROCEDURE — 1123F ACP DISCUSS/DSCN MKR DOCD: CPT | Performed by: FAMILY MEDICINE

## 2024-12-30 PROCEDURE — 1159F MED LIST DOCD IN RCRD: CPT | Performed by: FAMILY MEDICINE

## 2024-12-30 PROCEDURE — 1126F AMNT PAIN NOTED NONE PRSNT: CPT | Performed by: FAMILY MEDICINE

## 2024-12-30 PROCEDURE — 99214 OFFICE O/P EST MOD 30 MIN: CPT | Performed by: FAMILY MEDICINE

## 2024-12-30 PROCEDURE — 1036F TOBACCO NON-USER: CPT | Performed by: FAMILY MEDICINE

## 2024-12-30 NOTE — PROGRESS NOTES
Chief Complaint   Patient presents with    Pre-op Exam         Health Maintenance Due   Topic Date Due    AAA screen  Never done    Flu vaccine (1) 08/01/2024    COVID-19 Vaccine (4 - 2023-24 season) 09/01/2024         \"Have you been to the ER, urgent care clinic since your last visit?  Hospitalized since your last visit?\"    NO    “Have you seen or consulted any other health care providers outside of [facility] since your last visit?”    YES - When: approximately Yes ago.  Where and Why: Orthopaedic.             
patient were advised that Artificial Intelligence will be utilized during this visit to record and process the conversation to generate a clinical note. The patient (or guardian, if applicable) and other individuals in attendance at the appointment consented to the use of AI, including the recording.      An electronic signature was used to authenticate this note.    --Dick Tyson MD

## 2024-12-31 DIAGNOSIS — C44.609 SKIN CANCER OF ARM, LEFT: ICD-10-CM

## 2025-01-03 ENCOUNTER — CLINICAL DOCUMENTATION (OUTPATIENT)
Facility: CLINIC | Age: 75
End: 2025-01-03

## 2025-01-03 NOTE — PROGRESS NOTES
Chief Complaint   Patient presents with    pre op form faxed     Completed pre op form with office notes dated 12/30/2024 for clearance faxed to [facility] Attn Renetta Segura at [phone] - confirmation of receipt received by fax  Natacha Samayoa LPN 1/3/2025 11:23 AM

## 2025-01-13 ENCOUNTER — HOSPITAL ENCOUNTER (OUTPATIENT)
Facility: HOSPITAL | Age: 75
Discharge: HOME | End: 2025-01-16
Payer: MEDICARE

## 2025-01-13 VITALS
BODY MASS INDEX: 37.36 KG/M2 | SYSTOLIC BLOOD PRESSURE: 157 MMHG | TEMPERATURE: 97.5 F | RESPIRATION RATE: 18 BRPM | DIASTOLIC BLOOD PRESSURE: 95 MMHG | HEIGHT: 69 IN | HEART RATE: 86 BPM | WEIGHT: 252.21 LBS

## 2025-01-13 LAB
ABO + RH BLD: NORMAL
ANION GAP SERPL CALC-SCNC: 6 MMOL/L (ref 2–12)
APPEARANCE UR: CLEAR
BACTERIA URNS QL MICRO: NEGATIVE /HPF
BILIRUB UR QL: NEGATIVE
BLOOD GROUP ANTIBODIES SERPL: NORMAL
BUN SERPL-MCNC: 11 MG/DL (ref 6–20)
BUN/CREAT SERPL: 11 (ref 12–20)
CALCIUM SERPL-MCNC: 9.7 MG/DL (ref 8.5–10.1)
CHLORIDE SERPL-SCNC: 107 MMOL/L (ref 97–108)
CO2 SERPL-SCNC: 25 MMOL/L (ref 21–32)
COLOR UR: NORMAL
CREAT SERPL-MCNC: 1.04 MG/DL (ref 0.7–1.3)
EKG ATRIAL RATE: 84 BPM
EKG DIAGNOSIS: NORMAL
EKG P AXIS: 20 DEGREES
EKG P-R INTERVAL: 178 MS
EKG Q-T INTERVAL: 352 MS
EKG QRS DURATION: 88 MS
EKG QTC CALCULATION (BAZETT): 415 MS
EKG R AXIS: -45 DEGREES
EKG T AXIS: 45 DEGREES
EKG VENTRICULAR RATE: 84 BPM
EPITH CASTS URNS QL MICRO: NORMAL /LPF
ERYTHROCYTE [DISTWIDTH] IN BLOOD BY AUTOMATED COUNT: 12.3 % (ref 11.5–14.5)
EST. AVERAGE GLUCOSE BLD GHB EST-MCNC: 108 MG/DL
GLUCOSE SERPL-MCNC: 118 MG/DL (ref 65–100)
GLUCOSE UR STRIP.AUTO-MCNC: NEGATIVE MG/DL
HBA1C MFR BLD: 5.4 % (ref 4–5.6)
HCT VFR BLD AUTO: 53.7 % (ref 36.6–50.3)
HGB BLD-MCNC: 18.2 G/DL (ref 12.1–17)
HGB UR QL STRIP: NEGATIVE
HYALINE CASTS URNS QL MICRO: NORMAL /LPF (ref 0–5)
INR PPP: 1.1 (ref 0.9–1.1)
KETONES UR QL STRIP.AUTO: NEGATIVE MG/DL
LEUKOCYTE ESTERASE UR QL STRIP.AUTO: NEGATIVE
MCH RBC QN AUTO: 32.5 PG (ref 26–34)
MCHC RBC AUTO-ENTMCNC: 33.9 G/DL (ref 30–36.5)
MCV RBC AUTO: 95.9 FL (ref 80–99)
NITRITE UR QL STRIP.AUTO: NEGATIVE
NRBC # BLD: 0 K/UL (ref 0–0.01)
NRBC BLD-RTO: 0 PER 100 WBC
PH UR STRIP: 6 (ref 5–8)
PLATELET # BLD AUTO: 222 K/UL (ref 150–400)
PMV BLD AUTO: 10.6 FL (ref 8.9–12.9)
POTASSIUM SERPL-SCNC: 4.6 MMOL/L (ref 3.5–5.1)
PROT UR STRIP-MCNC: NEGATIVE MG/DL
PROTHROMBIN TIME: 11 SEC (ref 9–11.1)
RBC # BLD AUTO: 5.6 M/UL (ref 4.1–5.7)
RBC #/AREA URNS HPF: NORMAL /HPF (ref 0–5)
SODIUM SERPL-SCNC: 138 MMOL/L (ref 136–145)
SP GR UR REFRACTOMETRY: 1.02 (ref 1–1.03)
SPECIMEN EXP DATE BLD: NORMAL
URINE CULTURE IF INDICATED: NORMAL
UROBILINOGEN UR QL STRIP.AUTO: 1 EU/DL (ref 0.2–1)
WBC # BLD AUTO: 6.2 K/UL (ref 4.1–11.1)
WBC URNS QL MICRO: NORMAL /HPF (ref 0–4)

## 2025-01-13 PROCEDURE — 85027 COMPLETE CBC AUTOMATED: CPT

## 2025-01-13 PROCEDURE — 83036 HEMOGLOBIN GLYCOSYLATED A1C: CPT

## 2025-01-13 PROCEDURE — 81001 URINALYSIS AUTO W/SCOPE: CPT

## 2025-01-13 PROCEDURE — 80048 BASIC METABOLIC PNL TOTAL CA: CPT

## 2025-01-13 PROCEDURE — 86900 BLOOD TYPING SEROLOGIC ABO: CPT

## 2025-01-13 PROCEDURE — 86850 RBC ANTIBODY SCREEN: CPT

## 2025-01-13 PROCEDURE — 36415 COLL VENOUS BLD VENIPUNCTURE: CPT

## 2025-01-13 PROCEDURE — 85610 PROTHROMBIN TIME: CPT

## 2025-01-13 PROCEDURE — APPNB30 APP NON BILLABLE TIME 0-30 MINS: Performed by: NURSE PRACTITIONER

## 2025-01-13 PROCEDURE — 86901 BLOOD TYPING SEROLOGIC RH(D): CPT

## 2025-01-13 PROCEDURE — 93005 ELECTROCARDIOGRAM TRACING: CPT | Performed by: ORTHOPAEDIC SURGERY

## 2025-01-13 ASSESSMENT — KOOS JR
RISING FROM SITTING: SEVERE
TWISING OR PIVOTING ON KNEE: MODERATE
BENDING TO THE FLOOR TO PICK UP OBJECT: EXTREME
STANDING UPRIGHT: EXTREME
STRAIGHTENING KNEE FULLY: MODERATE
HOW SEVERE IS YOUR KNEE STIFFNESS AFTER FIRST WAKING IN MORNING: MODERATE
GOING UP OR DOWN STAIRS: SEVERE
KOOS JR TOTAL INTERVAL SCORE: 36.931

## 2025-01-13 ASSESSMENT — PROMIS GLOBAL HEALTH SCALE
IN THE PAST 7 DAYS, HOW WOULD YOU RATE YOUR FATIGUE ON AVERAGE [ON A SCALE FROM 1 (NONE) TO 5 (VERY SEVERE)]?: MODERATE
IN GENERAL, HOW WOULD YOU RATE YOUR PHYSICAL HEALTH [ON A SCALE OF 1 (POOR) TO 5 (EXCELLENT)]?: FAIR
IN THE PAST 7 DAYS, HOW OFTEN HAVE YOU BEEN BOTHERED BY EMOTIONAL PROBLEMS, SUCH AS FEELING ANXIOUS, DEPRESSED, OR IRRITABLE [ON A SCALE FROM 1 (NEVER) TO 5 (ALWAYS)]?: RARELY
SUM OF RESPONSES TO QUESTIONS 2, 4, 5, & 10: 13
IN GENERAL, HOW WOULD YOU RATE YOUR SATISFACTION WITH YOUR SOCIAL ACTIVITIES AND RELATIONSHIPS [ON A SCALE OF 1 (POOR) TO 5 (EXCELLENT)]?: GOOD
IN GENERAL, PLEASE RATE HOW WELL YOU CARRY OUT YOUR USUAL SOCIAL ACTIVITIES (INCLUDES ACTIVITIES AT HOME, AT WORK, AND IN YOUR COMMUNITY, AND RESPONSIBILITIES AS A PARENT, CHILD, SPOUSE, EMPLOYEE, FRIEND, ETC) [ON A SCALE OF 1 (POOR) TO 5 (EXCELLENT)]?: FAIR
HOW IS THE PROMIS V1.1 BEING ADMINISTERED?: PAPER
IN GENERAL, HOW WOULD YOU RATE YOUR MENTAL HEALTH, INCLUDING YOUR MOOD AND YOUR ABILITY TO THINK [ON A SCALE OF 1 (POOR) TO 5 (EXCELLENT)]?: GOOD
IN GENERAL, WOULD YOU SAY YOUR QUALITY OF LIFE IS...[ON A SCALE OF 1 (POOR) TO 5 (EXCELLENT)]: GOOD
SUM OF RESPONSES TO QUESTIONS 3, 6, 7, & 8: 12
TO WHAT EXTENT ARE YOU ABLE TO CARRY OUT YOUR EVERYDAY PHYSICAL ACTIVITIES SUCH AS WALKING, CLIMBING STAIRS, CARRYING GROCERIES, OR MOVING A CHAIR [ON A SCALE OF 1 (NOT AT ALL) TO 5 (COMPLETELY)]?: MODERATELY
IN THE PAST 7 DAYS, HOW WOULD YOU RATE YOUR PAIN ON AVERAGE [ON A SCALE FROM 0 (NO PAIN) TO 10 (WORST IMAGINABLE PAIN)]?: 4
IN GENERAL, WOULD YOU SAY YOUR HEALTH IS...[ON A SCALE OF 1 (POOR) TO 5 (EXCELLENT)]: GOOD

## 2025-01-13 ASSESSMENT — PAIN SCALES - GENERAL: PAINLEVEL_OUTOF10: 0

## 2025-01-14 PROBLEM — Z01.818 ENCOUNTER FOR PREADMISSION TESTING: Status: ACTIVE | Noted: 2025-01-14

## 2025-01-14 LAB
BACTERIA SPEC CULT: NORMAL
BACTERIA SPEC CULT: NORMAL
SERVICE CMNT-IMP: NORMAL

## 2025-01-14 NOTE — PERIOP NOTE
PAT Nurse Practitioner   Pre-Operative Chart Review/Assessment:-ORTHOPEDIC                Patient Name:  Duane S Coghill                                                           Age:   74 y.o.    :  1950     Today's Date:  2025     Date of PAT:   25      Date of Surgery:    25      Procedure(s):  Left Total Knee Arthroplasty     Anesthesia:   Spinal     Surgeon:   Dr. Recinos                       PLAN:      1)  PCP:  Dick Tyson MD      2)  Cardiac Clearance:  EKG and METs reviewed. No further cardiac evaluation requested. PAT EKG showed normal sinus rhythm, LAFB, PRWP, and no acute changes from EKG in 2017 .         3)  Diabetic Treatment Consult:  Not indicated. A1c-5.4      4)  Sleep Apnea evaluation:   HOLLIS score of 4. Pt reports loud snoring that can be heard through a closed door. Pt denies daytime fatigue, witnessed pauses in breathing, and a diagnosis of HTN. Referral not indicated.        5) Treatment for MRSA/Staph Aureus:  Neg      6) Discharge Plan:  TBD? Pt lives alone.      7) Skin: Denies open wounds, cuts, sores, rashes or other areas of concern in PAT assessment.      8) Additional Concerns:  Former smoker, EtOH: 5th of scotch/wk      9) Medication Recommendations Prior to Surgery:  None.      Additional Orders for Day of Surgery:  T&S      Records Scanned in Epic:   None              Vital Signs:        Vitals:    25 0816   BP: (!) 157/95   Pulse: 86   Resp: 18   Temp: 97.5 °F (36.4 °C)             Body mass index is 37.79 kg/m².       KNEE DISABILITY OSTEOARTHRITIS AND OUTCOME SCORE    Stiffness - The following question concerns the amount of joing stiffness you have experienced during the last week in your knee. Stiffness is a sensation of restriction or slowness in the ease with which you move your knee joint.  How severe is your knee stiffness after first wakening in the morning?: Moderate    Pain - What amount of knee pain have you experienced the last week during 
PAT/1-13 @ 8;00/CLASS 9;30  
scheduled for COVID testing during the 5 days, do NOT apply morning dose until after the COVID test has been performed.        Follow all instructions so your surgery won’t be cancelled.  Please, be on time.                    If a situation occurs and you are delayed the day of surgery, call [phone]/ [phone].    If your physical condition changes (like a fever, cold, flu, etc.) call your surgeon as soon as possible.    Home medication(s) reviewed and verified via during PAT appointment/call.    The patient was contacted in person.     He verbalized understanding of all instructions does not need reinforcement.

## 2025-01-28 ENCOUNTER — ANESTHESIA EVENT (OUTPATIENT)
Facility: HOSPITAL | Age: 75
End: 2025-01-28
Payer: MEDICARE

## 2025-01-29 ENCOUNTER — ANESTHESIA (OUTPATIENT)
Facility: HOSPITAL | Age: 75
End: 2025-01-29
Payer: MEDICARE

## 2025-01-29 ENCOUNTER — HOSPITAL ENCOUNTER (OUTPATIENT)
Facility: HOSPITAL | Age: 75
Setting detail: OBSERVATION
Discharge: HOME | End: 2025-01-30
Attending: ORTHOPAEDIC SURGERY | Admitting: ORTHOPAEDIC SURGERY
Payer: MEDICARE

## 2025-01-29 DIAGNOSIS — M17.12 OSTEOARTHRITIS OF LEFT KNEE, UNSPECIFIED OSTEOARTHRITIS TYPE: Primary | ICD-10-CM

## 2025-01-29 LAB
ABO + RH BLD: NORMAL
BLOOD GROUP ANTIBODIES SERPL: NORMAL
GLUCOSE BLD STRIP.AUTO-MCNC: 105 MG/DL (ref 65–117)
GLUCOSE BLD STRIP.AUTO-MCNC: 166 MG/DL (ref 65–117)
SERVICE CMNT-IMP: ABNORMAL
SERVICE CMNT-IMP: NORMAL
SPECIMEN EXP DATE BLD: NORMAL

## 2025-01-29 PROCEDURE — 2720000010 HC SURG SUPPLY STERILE: Performed by: ORTHOPAEDIC SURGERY

## 2025-01-29 PROCEDURE — 2580000003 HC RX 258: Performed by: PHYSICIAN ASSISTANT

## 2025-01-29 PROCEDURE — 97530 THERAPEUTIC ACTIVITIES: CPT

## 2025-01-29 PROCEDURE — C1776 JOINT DEVICE (IMPLANTABLE): HCPCS | Performed by: ORTHOPAEDIC SURGERY

## 2025-01-29 PROCEDURE — 6360000002 HC RX W HCPCS: Performed by: PHYSICIAN ASSISTANT

## 2025-01-29 PROCEDURE — 7100000001 HC PACU RECOVERY - ADDTL 15 MIN: Performed by: ORTHOPAEDIC SURGERY

## 2025-01-29 PROCEDURE — 27447 TOTAL KNEE ARTHROPLASTY: CPT | Performed by: ORTHOPAEDIC SURGERY

## 2025-01-29 PROCEDURE — 86850 RBC ANTIBODY SCREEN: CPT

## 2025-01-29 PROCEDURE — 3600000015 HC SURGERY LEVEL 5 ADDTL 15MIN: Performed by: ORTHOPAEDIC SURGERY

## 2025-01-29 PROCEDURE — 7100000000 HC PACU RECOVERY - FIRST 15 MIN: Performed by: ORTHOPAEDIC SURGERY

## 2025-01-29 PROCEDURE — 2500000003 HC RX 250 WO HCPCS: Performed by: PHYSICIAN ASSISTANT

## 2025-01-29 PROCEDURE — 2500000003 HC RX 250 WO HCPCS: Performed by: NURSE ANESTHETIST, CERTIFIED REGISTERED

## 2025-01-29 PROCEDURE — 6370000000 HC RX 637 (ALT 250 FOR IP): Performed by: PHYSICIAN ASSISTANT

## 2025-01-29 PROCEDURE — G0378 HOSPITAL OBSERVATION PER HR: HCPCS

## 2025-01-29 PROCEDURE — 36415 COLL VENOUS BLD VENIPUNCTURE: CPT

## 2025-01-29 PROCEDURE — 2580000003 HC RX 258: Performed by: ANESTHESIOLOGY

## 2025-01-29 PROCEDURE — 20985 CPTR-ASST DIR MS PX: CPT | Performed by: ORTHOPAEDIC SURGERY

## 2025-01-29 PROCEDURE — 2580000003 HC RX 258: Performed by: NURSE ANESTHETIST, CERTIFIED REGISTERED

## 2025-01-29 PROCEDURE — 3600000005 HC SURGERY LEVEL 5 BASE: Performed by: ORTHOPAEDIC SURGERY

## 2025-01-29 PROCEDURE — 86900 BLOOD TYPING SEROLOGIC ABO: CPT

## 2025-01-29 PROCEDURE — 6360000002 HC RX W HCPCS: Performed by: NURSE ANESTHETIST, CERTIFIED REGISTERED

## 2025-01-29 PROCEDURE — 97116 GAIT TRAINING THERAPY: CPT

## 2025-01-29 PROCEDURE — 3700000000 HC ANESTHESIA ATTENDED CARE: Performed by: ORTHOPAEDIC SURGERY

## 2025-01-29 PROCEDURE — 86901 BLOOD TYPING SEROLOGIC RH(D): CPT

## 2025-01-29 PROCEDURE — 97161 PT EVAL LOW COMPLEX 20 MIN: CPT

## 2025-01-29 PROCEDURE — 82962 GLUCOSE BLOOD TEST: CPT

## 2025-01-29 PROCEDURE — 2500000003 HC RX 250 WO HCPCS: Performed by: ORTHOPAEDIC SURGERY

## 2025-01-29 PROCEDURE — 2709999900 HC NON-CHARGEABLE SUPPLY: Performed by: ORTHOPAEDIC SURGERY

## 2025-01-29 PROCEDURE — 3700000001 HC ADD 15 MINUTES (ANESTHESIA): Performed by: ORTHOPAEDIC SURGERY

## 2025-01-29 PROCEDURE — 6360000002 HC RX W HCPCS: Performed by: ORTHOPAEDIC SURGERY

## 2025-01-29 PROCEDURE — 2580000003 HC RX 258: Performed by: ORTHOPAEDIC SURGERY

## 2025-01-29 PROCEDURE — 6360000002 HC RX W HCPCS: Performed by: ANESTHESIOLOGY

## 2025-01-29 PROCEDURE — 27447 TOTAL KNEE ARTHROPLASTY: CPT | Performed by: PHYSICIAN ASSISTANT

## 2025-01-29 DEVICE — ATTUNE KNEE SYSTEM TIBIAL INSERT FIXED BEARING MEDIAL STABILIZED LEFT AOX 7, 6MM
Type: IMPLANTABLE DEVICE | Site: KNEE | Status: FUNCTIONAL
Brand: ATTUNE

## 2025-01-29 DEVICE — ATTUNE KNEE SYSTEM FEMORAL POROCOAT CRUCIATE RETAINING SIZE 7 LEFT CEMENTLESS
Type: IMPLANTABLE DEVICE | Site: KNEE | Status: FUNCTIONAL
Brand: ATTUNE

## 2025-01-29 DEVICE — ATTUNE KNEE SYSTEM TIBIAL BASE AFFIXIUM FIXED BEARING SIZE 7
Type: IMPLANTABLE DEVICE | Site: KNEE | Status: FUNCTIONAL
Brand: ATTUNE AFFIXIUM

## 2025-01-29 DEVICE — KNEE K2 TOT HEMI ADV CMTLS IMPL CAPPED SYNTHES: Type: IMPLANTABLE DEVICE | Site: KNEE | Status: FUNCTIONAL

## 2025-01-29 RX ORDER — TRAMADOL HYDROCHLORIDE 50 MG/1
50 TABLET ORAL EVERY 6 HOURS PRN
Status: DISCONTINUED | OUTPATIENT
Start: 2025-01-29 | End: 2025-01-30 | Stop reason: HOSPADM

## 2025-01-29 RX ORDER — POLYETHYLENE GLYCOL 3350 17 G/17G
17 POWDER, FOR SOLUTION ORAL DAILY
Status: DISCONTINUED | OUTPATIENT
Start: 2025-01-29 | End: 2025-01-30 | Stop reason: HOSPADM

## 2025-01-29 RX ORDER — LIDOCAINE HYDROCHLORIDE 10 MG/ML
1 INJECTION, SOLUTION EPIDURAL; INFILTRATION; INTRACAUDAL; PERINEURAL
Status: DISCONTINUED | OUTPATIENT
Start: 2025-01-29 | End: 2025-01-29 | Stop reason: HOSPADM

## 2025-01-29 RX ORDER — TRANEXAMIC ACID 100 MG/ML
INJECTION, SOLUTION INTRAVENOUS PRN
Status: DISCONTINUED | OUTPATIENT
Start: 2025-01-29 | End: 2025-01-29 | Stop reason: HOSPADM

## 2025-01-29 RX ORDER — OXYCODONE HYDROCHLORIDE 5 MG/1
5 TABLET ORAL
Status: DISCONTINUED | OUTPATIENT
Start: 2025-01-29 | End: 2025-01-29 | Stop reason: HOSPADM

## 2025-01-29 RX ORDER — PROPOFOL 10 MG/ML
INJECTION, EMULSION INTRAVENOUS
Status: DISCONTINUED | OUTPATIENT
Start: 2025-01-29 | End: 2025-01-29 | Stop reason: SDUPTHER

## 2025-01-29 RX ORDER — SODIUM CHLORIDE 0.9 % (FLUSH) 0.9 %
5-40 SYRINGE (ML) INJECTION EVERY 12 HOURS SCHEDULED
Status: DISCONTINUED | OUTPATIENT
Start: 2025-01-29 | End: 2025-01-30 | Stop reason: HOSPADM

## 2025-01-29 RX ORDER — CELECOXIB 100 MG/1
100 CAPSULE ORAL ONCE
Status: COMPLETED | OUTPATIENT
Start: 2025-01-29 | End: 2025-01-29

## 2025-01-29 RX ORDER — DEXAMETHASONE SODIUM PHOSPHATE 4 MG/ML
INJECTION, SOLUTION INTRA-ARTICULAR; INTRALESIONAL; INTRAMUSCULAR; INTRAVENOUS; SOFT TISSUE
Status: DISCONTINUED | OUTPATIENT
Start: 2025-01-29 | End: 2025-01-29 | Stop reason: SDUPTHER

## 2025-01-29 RX ORDER — OXYCODONE HYDROCHLORIDE 5 MG/1
5 TABLET ORAL EVERY 4 HOURS PRN
Status: DISCONTINUED | OUTPATIENT
Start: 2025-01-29 | End: 2025-01-30 | Stop reason: HOSPADM

## 2025-01-29 RX ORDER — ONDANSETRON 2 MG/ML
4 INJECTION INTRAMUSCULAR; INTRAVENOUS
Status: DISCONTINUED | OUTPATIENT
Start: 2025-01-29 | End: 2025-01-29 | Stop reason: HOSPADM

## 2025-01-29 RX ORDER — SENNA AND DOCUSATE SODIUM 50; 8.6 MG/1; MG/1
1 TABLET, FILM COATED ORAL 2 TIMES DAILY
Status: DISCONTINUED | OUTPATIENT
Start: 2025-01-29 | End: 2025-01-30 | Stop reason: HOSPADM

## 2025-01-29 RX ORDER — FAMOTIDINE 20 MG/1
20 TABLET, FILM COATED ORAL 2 TIMES DAILY
Status: DISCONTINUED | OUTPATIENT
Start: 2025-01-29 | End: 2025-01-30 | Stop reason: HOSPADM

## 2025-01-29 RX ORDER — HYDRALAZINE HYDROCHLORIDE 20 MG/ML
10 INJECTION INTRAMUSCULAR; INTRAVENOUS
Status: DISCONTINUED | OUTPATIENT
Start: 2025-01-29 | End: 2025-01-29 | Stop reason: HOSPADM

## 2025-01-29 RX ORDER — SODIUM CHLORIDE 0.9 % (FLUSH) 0.9 %
5-40 SYRINGE (ML) INJECTION EVERY 12 HOURS SCHEDULED
Status: DISCONTINUED | OUTPATIENT
Start: 2025-01-29 | End: 2025-01-29 | Stop reason: HOSPADM

## 2025-01-29 RX ORDER — SODIUM CHLORIDE 9 MG/ML
INJECTION, SOLUTION INTRAVENOUS PRN
Status: DISCONTINUED | OUTPATIENT
Start: 2025-01-29 | End: 2025-01-29 | Stop reason: HOSPADM

## 2025-01-29 RX ORDER — ACETAMINOPHEN 325 MG/1
650 TABLET ORAL EVERY 6 HOURS
Status: DISCONTINUED | OUTPATIENT
Start: 2025-01-29 | End: 2025-01-30 | Stop reason: HOSPADM

## 2025-01-29 RX ORDER — LIDOCAINE HYDROCHLORIDE 10 MG/ML
INJECTION, SOLUTION INFILTRATION; PERINEURAL PRN
Status: DISCONTINUED | OUTPATIENT
Start: 2025-01-29 | End: 2025-01-29 | Stop reason: HOSPADM

## 2025-01-29 RX ORDER — HYDROMORPHONE HYDROCHLORIDE 1 MG/ML
0.5 INJECTION, SOLUTION INTRAMUSCULAR; INTRAVENOUS; SUBCUTANEOUS EVERY 5 MIN PRN
Status: COMPLETED | OUTPATIENT
Start: 2025-01-29 | End: 2025-01-29

## 2025-01-29 RX ORDER — PROCHLORPERAZINE EDISYLATE 5 MG/ML
5 INJECTION INTRAMUSCULAR; INTRAVENOUS
Status: DISCONTINUED | OUTPATIENT
Start: 2025-01-29 | End: 2025-01-29 | Stop reason: HOSPADM

## 2025-01-29 RX ORDER — PHENYLEPHRINE HCL IN 0.9% NACL 0.4MG/10ML
SYRINGE (ML) INTRAVENOUS
Status: DISCONTINUED | OUTPATIENT
Start: 2025-01-29 | End: 2025-01-29 | Stop reason: SDUPTHER

## 2025-01-29 RX ORDER — SODIUM CHLORIDE 9 MG/ML
INJECTION, SOLUTION INTRAVENOUS PRN
Status: DISCONTINUED | OUTPATIENT
Start: 2025-01-29 | End: 2025-01-30 | Stop reason: HOSPADM

## 2025-01-29 RX ORDER — ASPIRIN 81 MG/1
81 TABLET ORAL 2 TIMES DAILY
Status: DISCONTINUED | OUTPATIENT
Start: 2025-01-29 | End: 2025-01-30 | Stop reason: HOSPADM

## 2025-01-29 RX ORDER — SODIUM CHLORIDE 9 MG/ML
INJECTION, SOLUTION INTRAVENOUS CONTINUOUS
Status: DISCONTINUED | OUTPATIENT
Start: 2025-01-29 | End: 2025-01-30 | Stop reason: HOSPADM

## 2025-01-29 RX ORDER — MIDAZOLAM HYDROCHLORIDE 2 MG/2ML
2 INJECTION, SOLUTION INTRAMUSCULAR; INTRAVENOUS PRN
Status: DISCONTINUED | OUTPATIENT
Start: 2025-01-29 | End: 2025-01-29 | Stop reason: HOSPADM

## 2025-01-29 RX ORDER — NALOXONE HYDROCHLORIDE 0.4 MG/ML
INJECTION, SOLUTION INTRAMUSCULAR; INTRAVENOUS; SUBCUTANEOUS PRN
Status: DISCONTINUED | OUTPATIENT
Start: 2025-01-29 | End: 2025-01-29 | Stop reason: HOSPADM

## 2025-01-29 RX ORDER — SODIUM CHLORIDE, SODIUM LACTATE, POTASSIUM CHLORIDE, CALCIUM CHLORIDE 600; 310; 30; 20 MG/100ML; MG/100ML; MG/100ML; MG/100ML
INJECTION, SOLUTION INTRAVENOUS CONTINUOUS
Status: DISCONTINUED | OUTPATIENT
Start: 2025-01-29 | End: 2025-01-29 | Stop reason: HOSPADM

## 2025-01-29 RX ORDER — SODIUM CHLORIDE 0.9 % (FLUSH) 0.9 %
5-40 SYRINGE (ML) INJECTION PRN
Status: DISCONTINUED | OUTPATIENT
Start: 2025-01-29 | End: 2025-01-29 | Stop reason: HOSPADM

## 2025-01-29 RX ORDER — FENTANYL CITRATE 50 UG/ML
25 INJECTION, SOLUTION INTRAMUSCULAR; INTRAVENOUS EVERY 5 MIN PRN
Status: COMPLETED | OUTPATIENT
Start: 2025-01-29 | End: 2025-01-29

## 2025-01-29 RX ORDER — ONDANSETRON 4 MG/1
4 TABLET, ORALLY DISINTEGRATING ORAL EVERY 8 HOURS PRN
Status: DISCONTINUED | OUTPATIENT
Start: 2025-01-29 | End: 2025-01-30 | Stop reason: HOSPADM

## 2025-01-29 RX ORDER — NALOXONE HYDROCHLORIDE 0.4 MG/ML
0.4 INJECTION, SOLUTION INTRAMUSCULAR; INTRAVENOUS; SUBCUTANEOUS PRN
Status: DISCONTINUED | OUTPATIENT
Start: 2025-01-29 | End: 2025-01-30 | Stop reason: HOSPADM

## 2025-01-29 RX ORDER — SODIUM CHLORIDE 0.9 % (FLUSH) 0.9 %
5-40 SYRINGE (ML) INJECTION PRN
Status: DISCONTINUED | OUTPATIENT
Start: 2025-01-29 | End: 2025-01-30 | Stop reason: HOSPADM

## 2025-01-29 RX ORDER — FENTANYL CITRATE 50 UG/ML
100 INJECTION, SOLUTION INTRAMUSCULAR; INTRAVENOUS
Status: DISCONTINUED | OUTPATIENT
Start: 2025-01-29 | End: 2025-01-29 | Stop reason: HOSPADM

## 2025-01-29 RX ORDER — HYDROXYZINE HYDROCHLORIDE 10 MG/1
10 TABLET, FILM COATED ORAL EVERY 8 HOURS PRN
Status: DISCONTINUED | OUTPATIENT
Start: 2025-01-29 | End: 2025-01-30 | Stop reason: HOSPADM

## 2025-01-29 RX ORDER — ACETAMINOPHEN 500 MG
1000 TABLET ORAL ONCE
Status: DISCONTINUED | OUTPATIENT
Start: 2025-01-29 | End: 2025-01-29 | Stop reason: SDUPTHER

## 2025-01-29 RX ORDER — DEXAMETHASONE SODIUM PHOSPHATE 10 MG/ML
8 INJECTION, SOLUTION INTRAMUSCULAR; INTRAVENOUS ONCE
Status: DISCONTINUED | OUTPATIENT
Start: 2025-01-29 | End: 2025-01-29 | Stop reason: HOSPADM

## 2025-01-29 RX ORDER — 0.9 % SODIUM CHLORIDE 0.9 %
500 INTRAVENOUS SOLUTION INTRAVENOUS PRN
Status: DISCONTINUED | OUTPATIENT
Start: 2025-01-29 | End: 2025-01-30 | Stop reason: HOSPADM

## 2025-01-29 RX ORDER — ONDANSETRON 2 MG/ML
4 INJECTION INTRAMUSCULAR; INTRAVENOUS EVERY 6 HOURS PRN
Status: DISCONTINUED | OUTPATIENT
Start: 2025-01-29 | End: 2025-01-30 | Stop reason: HOSPADM

## 2025-01-29 RX ORDER — ONDANSETRON 2 MG/ML
INJECTION INTRAMUSCULAR; INTRAVENOUS
Status: DISCONTINUED | OUTPATIENT
Start: 2025-01-29 | End: 2025-01-29 | Stop reason: SDUPTHER

## 2025-01-29 RX ORDER — KETOROLAC TROMETHAMINE 30 MG/ML
15 INJECTION, SOLUTION INTRAMUSCULAR; INTRAVENOUS EVERY 6 HOURS
Status: COMPLETED | OUTPATIENT
Start: 2025-01-29 | End: 2025-01-30

## 2025-01-29 RX ORDER — ACETAMINOPHEN 500 MG
1000 TABLET ORAL ONCE
Status: COMPLETED | OUTPATIENT
Start: 2025-01-29 | End: 2025-01-29

## 2025-01-29 RX ORDER — LIDOCAINE HYDROCHLORIDE 20 MG/ML
INJECTION, SOLUTION EPIDURAL; INFILTRATION; INTRACAUDAL; PERINEURAL
Status: DISCONTINUED | OUTPATIENT
Start: 2025-01-29 | End: 2025-01-29 | Stop reason: SDUPTHER

## 2025-01-29 RX ORDER — EPHEDRINE SULFATE 50 MG/ML
INJECTION INTRAVENOUS
Status: DISCONTINUED | OUTPATIENT
Start: 2025-01-29 | End: 2025-01-29 | Stop reason: SDUPTHER

## 2025-01-29 RX ORDER — BISACODYL 10 MG
10 SUPPOSITORY, RECTAL RECTAL DAILY PRN
Status: DISCONTINUED | OUTPATIENT
Start: 2025-01-29 | End: 2025-01-30 | Stop reason: HOSPADM

## 2025-01-29 RX ADMIN — MIDAZOLAM 1 MG: 1 INJECTION INTRAMUSCULAR; INTRAVENOUS at 07:59

## 2025-01-29 RX ADMIN — FENTANYL CITRATE 25 MCG: 50 INJECTION INTRAMUSCULAR; INTRAVENOUS at 10:30

## 2025-01-29 RX ADMIN — ACETAMINOPHEN 650 MG: 325 TABLET ORAL at 17:47

## 2025-01-29 RX ADMIN — ACETAMINOPHEN 1000 MG: 500 TABLET ORAL at 07:46

## 2025-01-29 RX ADMIN — ONDANSETRON 4 MG: 2 INJECTION INTRAMUSCULAR; INTRAVENOUS at 08:38

## 2025-01-29 RX ADMIN — MIDAZOLAM 1 MG: 1 INJECTION INTRAMUSCULAR; INTRAVENOUS at 08:02

## 2025-01-29 RX ADMIN — SENNOSIDES AND DOCUSATE SODIUM 1 TABLET: 50; 8.6 TABLET ORAL at 21:45

## 2025-01-29 RX ADMIN — Medication 80 MCG: at 08:16

## 2025-01-29 RX ADMIN — DEXAMETHASONE SODIUM PHOSPHATE 4 MG: 4 INJECTION INTRA-ARTICULAR; INTRALESIONAL; INTRAMUSCULAR; INTRAVENOUS; SOFT TISSUE at 08:38

## 2025-01-29 RX ADMIN — HYDROMORPHONE HYDROCHLORIDE 0.5 MG: 1 INJECTION, SOLUTION INTRAMUSCULAR; INTRAVENOUS; SUBCUTANEOUS at 10:21

## 2025-01-29 RX ADMIN — LIDOCAINE HYDROCHLORIDE 50 MG: 20 INJECTION, SOLUTION EPIDURAL; INFILTRATION; INTRACAUDAL; PERINEURAL at 08:08

## 2025-01-29 RX ADMIN — PHENYLEPHRINE HYDROCHLORIDE 80 MCG/MIN: 10 INJECTION INTRAVENOUS at 08:20

## 2025-01-29 RX ADMIN — SODIUM CHLORIDE: 9 INJECTION, SOLUTION INTRAVENOUS at 10:15

## 2025-01-29 RX ADMIN — ACETAMINOPHEN 650 MG: 325 TABLET ORAL at 12:59

## 2025-01-29 RX ADMIN — SODIUM CHLORIDE: 9 INJECTION, SOLUTION INTRAVENOUS at 10:29

## 2025-01-29 RX ADMIN — WATER 2000 MG: 1 INJECTION INTRAMUSCULAR; INTRAVENOUS; SUBCUTANEOUS at 08:31

## 2025-01-29 RX ADMIN — PROPOFOL 50 MCG/KG/MIN: 10 INJECTION, EMULSION INTRAVENOUS at 08:09

## 2025-01-29 RX ADMIN — FENTANYL CITRATE 25 MCG: 50 INJECTION INTRAMUSCULAR; INTRAVENOUS at 10:07

## 2025-01-29 RX ADMIN — FENTANYL CITRATE 25 MCG: 50 INJECTION INTRAMUSCULAR; INTRAVENOUS at 10:16

## 2025-01-29 RX ADMIN — WATER 2000 MG: 1 INJECTION INTRAMUSCULAR; INTRAVENOUS; SUBCUTANEOUS at 15:39

## 2025-01-29 RX ADMIN — KETOROLAC TROMETHAMINE 15 MG: 30 INJECTION, SOLUTION INTRAMUSCULAR; INTRAVENOUS at 12:59

## 2025-01-29 RX ADMIN — ASPIRIN 81 MG: 81 TABLET, COATED ORAL at 21:44

## 2025-01-29 RX ADMIN — SODIUM CHLORIDE, POTASSIUM CHLORIDE, SODIUM LACTATE AND CALCIUM CHLORIDE: 600; 310; 30; 20 INJECTION, SOLUTION INTRAVENOUS at 07:30

## 2025-01-29 RX ADMIN — SODIUM CHLORIDE, PRESERVATIVE FREE 10 ML: 5 INJECTION INTRAVENOUS at 21:45

## 2025-01-29 RX ADMIN — EPHEDRINE SULFATE 15 MG: 50 INJECTION INTRAVENOUS at 08:16

## 2025-01-29 RX ADMIN — FAMOTIDINE 20 MG: 20 TABLET, FILM COATED ORAL at 21:44

## 2025-01-29 RX ADMIN — ASPIRIN 81 MG: 81 TABLET, COATED ORAL at 12:59

## 2025-01-29 RX ADMIN — FAMOTIDINE 20 MG: 20 TABLET, FILM COATED ORAL at 12:59

## 2025-01-29 RX ADMIN — TRANEXAMIC ACID 1000 MG: 100 INJECTION, SOLUTION INTRAVENOUS at 08:32

## 2025-01-29 RX ADMIN — PROPOFOL 50 MG: 10 INJECTION, EMULSION INTRAVENOUS at 08:08

## 2025-01-29 RX ADMIN — SENNOSIDES AND DOCUSATE SODIUM 1 TABLET: 50; 8.6 TABLET ORAL at 12:59

## 2025-01-29 RX ADMIN — HYDROMORPHONE HYDROCHLORIDE 0.5 MG: 1 INJECTION, SOLUTION INTRAMUSCULAR; INTRAVENOUS; SUBCUTANEOUS at 10:41

## 2025-01-29 RX ADMIN — CELECOXIB 100 MG: 100 CAPSULE ORAL at 07:46

## 2025-01-29 RX ADMIN — FENTANYL CITRATE 25 MCG: 50 INJECTION INTRAMUSCULAR; INTRAVENOUS at 10:00

## 2025-01-29 RX ADMIN — KETOROLAC TROMETHAMINE 15 MG: 30 INJECTION, SOLUTION INTRAMUSCULAR; INTRAVENOUS at 17:47

## 2025-01-29 ASSESSMENT — PAIN DESCRIPTION - DESCRIPTORS
DESCRIPTORS: ACHING
DESCRIPTORS: ACHING;SORE
DESCRIPTORS: SORE
DESCRIPTORS: ACHING
DESCRIPTORS: ACHING;SORE
DESCRIPTORS: ACHING
DESCRIPTORS: ACHING;SORE

## 2025-01-29 ASSESSMENT — PAIN DESCRIPTION - LOCATION
LOCATION: KNEE

## 2025-01-29 ASSESSMENT — PAIN DESCRIPTION - ORIENTATION
ORIENTATION: RIGHT
ORIENTATION: RIGHT
ORIENTATION: LEFT
ORIENTATION: RIGHT
ORIENTATION: LEFT
ORIENTATION: LEFT
ORIENTATION: RIGHT

## 2025-01-29 ASSESSMENT — PAIN SCALES - GENERAL
PAINLEVEL_OUTOF10: 4
PAINLEVEL_OUTOF10: 7
PAINLEVEL_OUTOF10: 7
PAINLEVEL_OUTOF10: 5
PAINLEVEL_OUTOF10: 5
PAINLEVEL_OUTOF10: 7
PAINLEVEL_OUTOF10: 7
PAINLEVEL_OUTOF10: 0
PAINLEVEL_OUTOF10: 5
PAINLEVEL_OUTOF10: 5
PAINLEVEL_OUTOF10: 7
PAINLEVEL_OUTOF10: 4
PAINLEVEL_OUTOF10: 8

## 2025-01-29 ASSESSMENT — PAIN SCALES - WONG BAKER
WONGBAKER_NUMERICALRESPONSE: HURTS LITTLE MORE
WONGBAKER_NUMERICALRESPONSE: NO HURT
WONGBAKER_NUMERICALRESPONSE: HURTS LITTLE MORE

## 2025-01-29 NOTE — PERIOP NOTE
Dr. Rothman aware of patient status and BP, no treatment ordered.  Patient appears relaxed and comfortable, cleared for discharge to floor.

## 2025-01-29 NOTE — PLAN OF CARE
Problem: Physical Therapy - Adult  Goal: By Discharge: Performs mobility at highest level of function for planned discharge setting.  See evaluation for individualized goals.  Description: FUNCTIONAL STATUS PRIOR TO ADMISSION: Patient was independent without use of DME.    HOME SUPPORT PRIOR TO ADMISSION: The patient lived alone and did not require assistance.  Son will be staying with patient initially.      Physical Therapy Goals  Initiated 1/29/2025  1.  Patient will move from supine to sit and sit to supine and scoot up and down in bed with modified independence within 4 day(s).    2.  Patient will perform sit to stand with modified independence within 4 day(s).  3.  Patient will transfer from bed to chair and chair to bed with modified independence using the least restrictive device within 4 day(s).  4.  Patient will ambulate with modified independence for > 150 feet with the least restrictive device within 4 day(s).   5.  Patient will ascend/descend 4 stairs with one handrail(s) with supervision within 4 day(s).  6. Patient will perform TKR home exercise program per protocol with supervision within 4 days.  7. Patient will demonstrate AROM 0-90 degrees in operative joint within 4 days.     PHYSICAL THERAPY EVALUATION    Patient: Duane S Coghill (74 y.o. male)  Date: 1/29/2025  Primary Diagnosis: Arthritis of left knee [M17.12]  Osteoarthritis of left knee, unspecified osteoarthritis type [M17.12]  Procedure(s) (LRB):  ROBOTIC LEFT TOTAL KNEE ARTHROPLASTY (VELYS) (Left) Day of Surgery   Precautions: Restrictions/Precautions: Weight Bearing, Fall Risk  Activity Level: Up with Assist   Lower Extremity Weight Bearing Restrictions  Left Lower Extremity Weight Bearing: Weight Bearing As Tolerated                  ASSESSMENT :   DEFICITS/IMPAIRMENTS:   The patient presents POD # 0 left TKR with pain left knee, decreased AROM/strength and function left leg, decreased activity tolerance, decline in functional

## 2025-01-29 NOTE — ANESTHESIA PRE PROCEDURE
reviewed  Airway: Mallampati: II  TM distance: >3 FB   Neck ROM: full  Mouth opening: > = 3 FB   Dental: normal exam         Pulmonary:Negative Pulmonary ROS breath sounds clear to auscultation                             Cardiovascular:Negative CV ROS  Exercise tolerance: good (>4 METS)          Rhythm: regular  Rate: normal                    Neuro/Psych:   Negative Neuro/Psych ROS              GI/Hepatic/Renal: Neg GI/Hepatic/Renal ROS            Endo/Other: Negative Endo/Other ROS                    Abdominal: normal exam            Vascular: negative vascular ROS.         Other Findings:       Anesthesia Plan      spinal     ASA 2       Induction: intravenous.    MIPS: Postoperative opioids intended and Prophylactic antiemetics administered.  Anesthetic plan and risks discussed with patient.    Use of blood products discussed with patient whom consented to blood products.    Plan discussed with CRNA.                Milan Rothman MD   1/29/2025

## 2025-01-29 NOTE — ANESTHESIA POSTPROCEDURE EVALUATION
Department of Anesthesiology  Postprocedure Note    Patient: Duane S Coghill  MRN: [id number]  YOB: 1950  Date of evaluation: 1/29/2025    Procedure Summary       Date: 01/29/25 Room / Location: [facility] MAIN OR [address] [place] [address] MAIN OR    Anesthesia Start: 0759 Anesthesia Stop: 0957    Procedure: ROBOTIC LEFT TOTAL KNEE ARTHROPLASTY (VELYS) (Left: Knee) Diagnosis:       Arthritis of left knee      (Arthritis of left knee [M17.12])    Providers: James Recinos MD Responsible Provider: Milna Rothman Jr., MD    Anesthesia Type: Regional, General ASA Status: 2            Anesthesia Type: Regional, General    Dickson Phase I: Dickson Score: 9    Dickson Phase II:      Anesthesia Post Evaluation    Patient location during evaluation: PACU  Patient participation: complete - patient participated  Level of consciousness: awake  Pain score: 2  Airway patency: patent  Nausea & Vomiting: no nausea  Cardiovascular status: blood pressure returned to baseline and hemodynamically stable  Respiratory status: acceptable  Hydration status: stable  Multimodal analgesia pain management approach    No notable events documented.

## 2025-01-29 NOTE — CARE COORDINATION
Referral sent to [facility] via [facility].  [facility] accepted.  AVS updated.    CM ordered RW via [facility] as [place] was down.  Walker has been delivered to patient.     01/29/25 1505   Condition of Participation: Discharge Planning   The Plan for Transition of Care is related to the following treatment goals: Home health   The Patient and/or Patient Representative was provided with a Choice of Provider? Patient   The Patient and/Or Patient Representative agree with the Discharge Plan? Yes   Freedom of Choice list was provided with basic dialogue that supports the patient's individualized plan of care/goals, treatment preferences, and shares the quality data associated with the providers?  Yes

## 2025-01-29 NOTE — H&P
Update History & Physical    The patient's History and Physical  was reviewed with the patient and I examined the patient. There was no change. The surgical site was confirmed by the patient and me.       Plan: The risks, benefits, expected outcome, and alternative to the recommended procedure have been discussed with the patient. Patient understands and wants to proceed with the procedure.     Electronically signed by James Recinos MD on 1/29/2025 at 7:28 AM

## 2025-01-29 NOTE — OP NOTE
Name: Duane S Coghill  MRN:  [id number]  : 1950  Age:  74 y.o.  Surgery Date: 2025      OPERATIVE REPORT - LEFT TOTAL KNEE REPLACEMENT-SUBVASTUS    PREOPERATIVE DIAGNOSIS: Osteoarthritis, left knee.    POSTOPERATIVE DIAGNOSIS: Osteoarthritis, left knee.    PROCEDURE PERFORMED: Computer assisted LEFT total knee arthroplasty Velys Robot    SURGEON: James Recinos MD    FIRST ASSISTANT:  Vickie Schreiber PA-C    ANESTHESIA: Spinal    PRE-OP ANTIBIOTIC: Ancef 2g    COMPLICATIONS: None.    ESTIMATED BLOOD LOSS: 100 mL.    SPECIMENS REMOVED: None.    COMPONENTS IMPLANTED:   Implant Name Type Inv. Item Serial No.  Lot No. LRB No. Used Action   SYSTEM KNEE SZ 7 LT FEM PORCOAT CRUC RET CEMENTLESS ATTUNE - SN/A  SYSTEM KNEE SZ 7 LT FEM PORCOAT CRUC RET CEMENTLESS ATTUNE N/A [employer] [facility] [id number] Left 1 Implanted   BEARING TIB FIX 7 KNEE BASE POROUS ATTUNE AFFIXIUM - SN/A  BEARING TIB FIX 7 KNEE BASE POROUS ATTUNE AFFIXIUM N/A [employer] [facility] [id number] Left 1 Implanted   INSERT TIB FIX BEAR 7 6 MM LT MEDL KNEE STBL AOX ATTUNE - SN/A  INSERT TIB FIX BEAR 7 6 MM LT MEDL KNEE STBL AOX ATTUNE N/A [employer] [facility] [id number] Left 1 Implanted       Intra-operative ROM revealed 12 degrees flexion contracture and 11 degrees varus.   ROM with trials in place revealed 1 degrees flexion contracture and 4 degrees varus.     INDICATIONS: The patient is an 74 yrs male with progressive debilitating left knee pain due to severe osteoarthritis. Symptoms have progressed despite comprehensive conservative treatment and they presents for left total knee replacement. Risks, benefits, alternatives of the procedure were reviewed in detail and the patient desired to proceed. Risks including bleeding, infection, damage to surrounding structures, blood clots, pulmonary embolism, and death were discussed. The patient understood understands the increased risk for perioperative medical

## 2025-01-29 NOTE — DISCHARGE INSTRUCTIONS
Discharge Instructions Knee Replacement  Dr. Recinos  Patient Name  Duane S Coghill    Follow up care  Follow up visit with Dr. Recinos/Vickie Schreiber PA-C on 2/18/25 at 2:30pm at [facility] office    Call [phone] to make any changes as needed  If Home Health has been arranged for you, they will call you to arrange dates/times for visits. Call them if you do not hear from them within 24 hours after you go home.    Activity at home  Take a short walk every hour; except at night when sleeping.  Do your Home Exercise Program 3 times every day.   After exercising lie down and elevate your leg on pillows for 15-30 minutes to decrease swelling.  Refer to your patient notebook for more information.  Bathing and caring for your incision  You may take a shower with your waterproof dressing on your knee.  The waterproof dressing is to stay on your knee until follow up  You may then leave your incision open to air unless you see drainage from your knee.     Preventing blood clots  Take Aspirin 81mg twice each day for one month (30 days) following surgery.   Call Dr. Recinos for signs of a blood clot in your leg: calf pain, tenderness, redness, swelling of lower leg   Preventing lung congestion  Use your incentive spirometer 4 times a day; do 10 repetitions each time  Remember to keep the small blue ball between the two arrows when taking a slow, deep breath   Pain Management  Get up and walk a short distance to relieve pain and stiffness.  Place ice wrap on your knee except when you are walking. The gel ice packs should be changed about every 4 hours.  Elevate your leg on pillows for 15-30 minutes.   Pain Medications  Take Tylenol 650mg (take two 325mg tablets) every 6 hours for the next 4 weeks.  Take Meloxicam (Mobic) every day as prescribed to decrease swelling and inflammation. Do not take Meloxicam if you have had stomach ulcers.  Take Famotidine (Pepcid) 20mg twice a day to prevent an upset stomach (if

## 2025-01-30 VITALS
RESPIRATION RATE: 16 BRPM | OXYGEN SATURATION: 94 % | SYSTOLIC BLOOD PRESSURE: 153 MMHG | TEMPERATURE: 98.2 F | HEART RATE: 78 BPM | DIASTOLIC BLOOD PRESSURE: 85 MMHG

## 2025-01-30 LAB
ANION GAP SERPL CALC-SCNC: 6 MMOL/L (ref 2–12)
BUN SERPL-MCNC: 12 MG/DL (ref 6–20)
BUN/CREAT SERPL: 14 (ref 12–20)
CALCIUM SERPL-MCNC: 9.1 MG/DL (ref 8.5–10.1)
CHLORIDE SERPL-SCNC: 107 MMOL/L (ref 97–108)
CO2 SERPL-SCNC: 25 MMOL/L (ref 21–32)
CREAT SERPL-MCNC: 0.83 MG/DL (ref 0.7–1.3)
GLUCOSE SERPL-MCNC: 96 MG/DL (ref 65–100)
HCT VFR BLD AUTO: 48.5 % (ref 36.6–50.3)
HGB BLD-MCNC: 16.3 G/DL (ref 12.1–17)
POTASSIUM SERPL-SCNC: 3.9 MMOL/L (ref 3.5–5.1)
SODIUM SERPL-SCNC: 138 MMOL/L (ref 136–145)

## 2025-01-30 PROCEDURE — G0378 HOSPITAL OBSERVATION PER HR: HCPCS

## 2025-01-30 PROCEDURE — 97165 OT EVAL LOW COMPLEX 30 MIN: CPT

## 2025-01-30 PROCEDURE — 80048 BASIC METABOLIC PNL TOTAL CA: CPT

## 2025-01-30 PROCEDURE — 97110 THERAPEUTIC EXERCISES: CPT

## 2025-01-30 PROCEDURE — 96374 THER/PROPH/DIAG INJ IV PUSH: CPT

## 2025-01-30 PROCEDURE — 6370000000 HC RX 637 (ALT 250 FOR IP): Performed by: PHYSICIAN ASSISTANT

## 2025-01-30 PROCEDURE — 97116 GAIT TRAINING THERAPY: CPT

## 2025-01-30 PROCEDURE — 97535 SELF CARE MNGMENT TRAINING: CPT

## 2025-01-30 PROCEDURE — 85018 HEMOGLOBIN: CPT

## 2025-01-30 PROCEDURE — 96376 TX/PRO/DX INJ SAME DRUG ADON: CPT

## 2025-01-30 PROCEDURE — 2500000003 HC RX 250 WO HCPCS: Performed by: PHYSICIAN ASSISTANT

## 2025-01-30 PROCEDURE — 85014 HEMATOCRIT: CPT

## 2025-01-30 PROCEDURE — 6360000002 HC RX W HCPCS: Performed by: PHYSICIAN ASSISTANT

## 2025-01-30 PROCEDURE — 97530 THERAPEUTIC ACTIVITIES: CPT

## 2025-01-30 RX ORDER — MIDAZOLAM HYDROCHLORIDE 1 MG/ML
INJECTION, SOLUTION INTRAMUSCULAR; INTRAVENOUS
Status: DISCONTINUED | OUTPATIENT
Start: 2025-01-29 | End: 2025-01-30 | Stop reason: SDUPTHER

## 2025-01-30 RX ORDER — MELOXICAM 7.5 MG/1
7.5 TABLET ORAL DAILY
Qty: 30 TABLET | Refills: 1 | Status: SHIPPED | OUTPATIENT
Start: 2025-01-30

## 2025-01-30 RX ORDER — OXYCODONE HYDROCHLORIDE 5 MG/1
5 TABLET ORAL EVERY 4 HOURS PRN
Qty: 42 TABLET | Refills: 0 | Status: SHIPPED | OUTPATIENT
Start: 2025-01-30 | End: 2025-02-06

## 2025-01-30 RX ORDER — FAMOTIDINE 20 MG/1
20 TABLET, FILM COATED ORAL 2 TIMES DAILY
Qty: 60 TABLET | Refills: 0 | Status: SHIPPED | OUTPATIENT
Start: 2025-01-30

## 2025-01-30 RX ORDER — DEXAMETHASONE 4 MG/1
4 TABLET ORAL
Qty: 4 TABLET | Refills: 0 | Status: SHIPPED | OUTPATIENT
Start: 2025-01-30 | End: 2025-02-03

## 2025-01-30 RX ORDER — TRAMADOL HYDROCHLORIDE 50 MG/1
50 TABLET ORAL EVERY 6 HOURS PRN
Qty: 42 TABLET | Refills: 0 | Status: SHIPPED | OUTPATIENT
Start: 2025-01-30 | End: 2025-02-10

## 2025-01-30 RX ORDER — ASPIRIN 81 MG/1
81 TABLET, CHEWABLE ORAL 2 TIMES DAILY
Qty: 60 TABLET | Refills: 0 | Status: SHIPPED | OUTPATIENT
Start: 2025-01-30

## 2025-01-30 RX ADMIN — WATER 2000 MG: 1 INJECTION INTRAMUSCULAR; INTRAVENOUS; SUBCUTANEOUS at 01:11

## 2025-01-30 RX ADMIN — TRAMADOL HYDROCHLORIDE 50 MG: 50 TABLET ORAL at 07:14

## 2025-01-30 RX ADMIN — ACETAMINOPHEN 650 MG: 325 TABLET ORAL at 01:09

## 2025-01-30 RX ADMIN — SENNOSIDES AND DOCUSATE SODIUM 1 TABLET: 50; 8.6 TABLET ORAL at 10:22

## 2025-01-30 RX ADMIN — KETOROLAC TROMETHAMINE 15 MG: 30 INJECTION, SOLUTION INTRAMUSCULAR; INTRAVENOUS at 01:11

## 2025-01-30 RX ADMIN — KETOROLAC TROMETHAMINE 15 MG: 30 INJECTION, SOLUTION INTRAMUSCULAR; INTRAVENOUS at 07:15

## 2025-01-30 RX ADMIN — POLYETHYLENE GLYCOL 3350 17 G: 17 POWDER, FOR SOLUTION ORAL at 10:22

## 2025-01-30 RX ADMIN — ACETAMINOPHEN 650 MG: 325 TABLET ORAL at 07:15

## 2025-01-30 RX ADMIN — ASPIRIN 81 MG: 81 TABLET, COATED ORAL at 10:22

## 2025-01-30 RX ADMIN — FAMOTIDINE 20 MG: 20 TABLET, FILM COATED ORAL at 10:22

## 2025-01-30 ASSESSMENT — PAIN DESCRIPTION - DESCRIPTORS: DESCRIPTORS: DISCOMFORT

## 2025-01-30 ASSESSMENT — PAIN DESCRIPTION - LOCATION
LOCATION: KNEE
LOCATION: KNEE

## 2025-01-30 ASSESSMENT — PAIN SCALES - GENERAL
PAINLEVEL_OUTOF10: 2
PAINLEVEL_OUTOF10: 5

## 2025-01-30 ASSESSMENT — PAIN DESCRIPTION - ORIENTATION
ORIENTATION: LEFT
ORIENTATION: LEFT

## 2025-01-30 NOTE — PROGRESS NOTES
Discussed with the patient the discharge instructions and all questions fully answered.     
TRANSFER - OUT REPORT:    Verbal report given to JONE Echols on Duane S Coghill  being transferred to  for routine post-op       Report consisted of patient's Situation, Background, Assessment and   Recommendations(SBAR).     Information from the following report(s) Surgery Report, Intake/Output, MAR, and Recent Results was reviewed with the receiving nurse.           Lines:   Peripheral IV 01/29/25 Right Antecubital (Active)   Site Assessment Clean, dry & intact 01/29/25 1059   Line Status Infiltrated 01/29/25 1059   Line Care Cap changed 01/29/25 1059   Phlebitis Assessment No symptoms 01/29/25 1059   Infiltration Assessment 0 01/29/25 1059   Alcohol Cap Used Yes 01/29/25 1059   Dressing Status Clean, dry & intact 01/29/25 1059   Dressing Type Transparent 01/29/25 1059        Opportunity for questions and clarification was provided.      Patient transported with:  O2 @ 2lpm and Tech        
injection 0.5 mg  0.5 mg IntraVENous Q3H PRN    ondansetron (ZOFRAN-ODT) disintegrating tablet 4 mg  4 mg Oral Q8H PRN    Or    ondansetron (ZOFRAN) injection 4 mg  4 mg IntraVENous Q6H PRN    polyethylene glycol (GLYCOLAX) packet 17 g  17 g Oral Daily    sennosides-docusate sodium (SENOKOT-S) 8.6-50 MG tablet 1 tablet  1 tablet Oral BID    bisacodyl (DULCOLAX) suppository 10 mg  10 mg Rectal Daily PRN    aspirin EC tablet 81 mg  81 mg Oral BID    famotidine (PEPCID) tablet 20 mg  20 mg Oral BID    Or    famotidine (PEPCID) 20 mg in sodium chloride (PF) 0.9 % 10 mL injection  20 mg IntraVENous BID    hydrOXYzine HCl (ATARAX) tablet 10 mg  10 mg Oral Q8H PRN    traMADol (ULTRAM) tablet 50 mg  50 mg Oral Q6H PRN    naloxone (NARCAN) injection 0.4 mg  0.4 mg IntraVENous PRN       Lab Results   Component Value Date/Time    HGB 16.3 01/30/2025 06:41 AM    INR 1.1 01/13/2025 08:18 AM       Lab Results   Component Value Date/Time     01/30/2025 06:41 AM    K 3.9 01/30/2025 06:41 AM     01/30/2025 06:41 AM    CO2 25 01/30/2025 06:41 AM    BUN 12 01/30/2025 06:41 AM            74 y.o. male s/p left total knee arthroplasty on 1/29/2025. Doing well.       ABX: Complete 24 hours perioperative abx  PATHWAY: Straight cath per protocol if needed  DVT Prophylaxis: Aspirin 81 mg enteric coated BID  Weight Bearing: WBAT LLE   Pain Control: Toradol, Tylenol, 15 mg meloxicam to begin evening POD 1 if patient still in hospital, tramadol every 6 hours, oxy 5 mg for breakthrough pain  Disposition: Home, [facility]   
chair, Call bell within reach, and Caregiver / family present    COMMUNICATION/EDUCATION:   The patient's plan of care was discussed with: physical therapist and registered nurse    Patient Education  Education Given To: Patient;Family  Education Provided: Role of Therapy;Plan of Care;ADL Adaptive Strategies;Transfer Training;Mobility Training;Fall Prevention Strategies;Equipment;IADL Safety  Education Method: Demonstration;Verbal  Barriers to Learning: None  Education Outcome: Verbalized understanding;Demonstrated understanding    Thank you for this referral.  Cheryl Decker OT  Minutes: 25    Occupational Therapy Evaluation Charge Determination   History Examination Decision-Making   LOW Complexity : Brief history review  LOW Complexity: 1-3 Performance deficits relating to physical, cognitive, or psychosocial skills that result in activity limitations and/or participation restrictions LOW Complexity: No comorbidities that affect functional and  no verbal  or physical assist needed to complete eval tasks   Based on the above components, the patient evaluation is determined to be of the following complexity level: Low

## 2025-01-30 NOTE — ADDENDUM NOTE
Addendum  created 01/30/25 0808 by Joo Baum APRN - VANESSA    Intraprocedure Meds edited, Orders acknowledged in Narrator

## 2025-01-30 NOTE — PLAN OF CARE
Problem: Physical Therapy - Adult  Goal: By Discharge: Performs mobility at highest level of function for planned discharge setting.  See evaluation for individualized goals.  Description: FUNCTIONAL STATUS PRIOR TO ADMISSION: Patient was independent without use of DME.    HOME SUPPORT PRIOR TO ADMISSION: The patient lived alone and did not require assistance.  Son will be staying with patient initially.      Physical Therapy Goals  Initiated 1/29/2025  1.  Patient will move from supine to sit and sit to supine and scoot up and down in bed with modified independence within 4 day(s).    2.  Patient will perform sit to stand with modified independence within 4 day(s).  3.  Patient will transfer from bed to chair and chair to bed with modified independence using the least restrictive device within 4 day(s).  4.  Patient will ambulate with modified independence for > 150 feet with the least restrictive device within 4 day(s).   5.  Patient will ascend/descend 4 stairs with one handrail(s) with supervision within 4 day(s).  6. Patient will perform TKR home exercise program per protocol with supervision within 4 days.  7. Patient will demonstrate AROM 0-90 degrees in operative joint within 4 days.     Outcome: Adequate for Discharge       PHYSICAL THERAPY TREATMENT    Patient: Duane S Coghill (74 y.o. male)  Date: 1/30/2025  Diagnosis: Arthritis of left knee [M17.12]  Osteoarthritis of left knee, unspecified osteoarthritis type [M17.12] Arthritis of left knee  Procedure(s) (LRB):  ROBOTIC LEFT TOTAL KNEE ARTHROPLASTY (VELYS) (Left) 1 Day Post-Op  Precautions: Weight Bearing, Fall Risk   Left Lower Extremity Weight Bearing: Weight Bearing As Tolerated                  ASSESSMENT:  Patient continues to benefit from skilled PT services and is progressing towards goals. Pt generally mobilized at a Modified Independent to SPV level during today's session. Pt received semi patterson in bed, on RA, family present, and agreeable

## (undated) DEVICE — SUTURE STRATAFIX SYMMETRIC PDS + SZ 1 L18IN ABSRB VLT L48MM SXPP1A400

## (undated) DEVICE — ATTUNE SOLO PINNING SYSTEM

## (undated) DEVICE — GLOVE SURG SZ 8 L12IN FNGR THK94MIL STD WHT LTX FREE

## (undated) DEVICE — Device

## (undated) DEVICE — ZIMMER® STERILE DISPOSABLE TOURNIQUET CUFF WITH PLC, DUAL PORT, SINGLE BLADDER, 34 IN. (86 CM)

## (undated) DEVICE — DRAPE,EXTREMITY,89X128,STERILE: Brand: MEDLINE

## (undated) DEVICE — SOLUTION SURG PREP 26 CC PURPREP

## (undated) DEVICE — SUTURE MONOCRYL STRATAFIX SPRL + 3 0 SGL ARMED PS 1 24 IN LEN SXMP1B103

## (undated) DEVICE — GLOVE ORTHO 8   MSG9480

## (undated) DEVICE — TOTAL JOINT - SMH: Brand: MEDLINE INDUSTRIES, INC.

## (undated) DEVICE — PIN DRL 4X125 MM ROBOTIC-ASSISTED SOLUTION ARRY VELYS

## (undated) DEVICE — GLOVE SURG SZ 65 L12IN FNGR THK94MIL STD WHT LTX FREE

## (undated) DEVICE — SPONGE GZ W4XL4IN COT 12 PLY TYP VII WVN C FLD DSGN STERILE

## (undated) DEVICE — 2108 SERIES SAGITTAL BLADE, NO OFFSET  (12.4 X 1.19 X 82.1MM)

## (undated) DEVICE — GLOVE SURG SZ 65 L12IN FNGR THK79MIL GRN LTX FREE

## (undated) DEVICE — CONTAINER,SPECIMEN,4OZ,OR STRL: Brand: MEDLINE

## (undated) DEVICE — YANKAUER,FLEXIBLE HANDLE,REGLR CAPACITY: Brand: MEDLINE INDUSTRIES, INC.

## (undated) DEVICE — 4-PORT MANIFOLD: Brand: NEPTUNE 2

## (undated) DEVICE — ELECTRODE PT RET AD L9FT HI MOIST COND ADH HYDRGEL CORDED

## (undated) DEVICE — HANDPIECE SET WITH BONE CLEANING TIP AND SUCTION TUBE: Brand: INTERPULSE

## (undated) DEVICE — DRAPE EQUIP SATELLITE STN STRL VELYS

## (undated) DEVICE — SUTURE VICRYL 1 L27IN ABSRB CT BRAID COAT UD J281H

## (undated) DEVICE — HYPODERMIC SAFETY NEEDLE: Brand: MAGELLAN

## (undated) DEVICE — SOLUTION WND IRRIGATION 450 ML 0.5 PVP-I 0.9 NACL

## (undated) DEVICE — SYRINGE 20ML LL S/C 50

## (undated) DEVICE — DRAPE EQUIP ROBOT STRL VELYS 20/PK ORDER IN MULTIIPLES OF 20 EACH

## (undated) DEVICE — APPLICATOR MEDICATED 26 CC SOLUTION HI LT ORNG CHLORAPREP

## (undated) DEVICE — HOOD, PEEL-AWAY: Brand: FLYTE

## (undated) DEVICE — BLADE SAW OSCILLATING 85X19X2 MM ROBOTIC-ASSISTED VELYS

## (undated) DEVICE — GOWN,SIRUS,NONRNF,SETINSLV,2XL,18/CS: Brand: MEDLINE

## (undated) DEVICE — SCRUBIN SCRUB BRUSH DRY STER: Brand: MEDLINE INDUSTRIES, INC.

## (undated) DEVICE — SOLUTION IRRIG 3000ML 0.9% SOD CHL USP UROMATIC PLAS CONT

## (undated) DEVICE — SUTURE ABS MF 2-0 CT1 27IN STRATAFIX PDS+ SXPP1B412

## (undated) DEVICE — TUBING SUCT 12FR MAL ALUM SHFT FN CAP VENT UNIV CONN W/ OBT